# Patient Record
Sex: FEMALE | URBAN - METROPOLITAN AREA
[De-identification: names, ages, dates, MRNs, and addresses within clinical notes are randomized per-mention and may not be internally consistent; named-entity substitution may affect disease eponyms.]

---

## 2021-01-05 ENCOUNTER — NEW PATIENT (OUTPATIENT)
Dept: URBAN - METROPOLITAN AREA CLINIC 44 | Facility: CLINIC | Age: 61
End: 2021-01-05
Payer: COMMERCIAL

## 2021-01-05 DIAGNOSIS — H43.393 OTHER VITREOUS OPACITIES, BILATERAL: Primary | ICD-10-CM

## 2021-01-05 DIAGNOSIS — H16.143 PUNCTATE KERATITIS, BILATERAL: ICD-10-CM

## 2021-01-05 PROCEDURE — 92004 COMPRE OPH EXAM NEW PT 1/>: CPT | Performed by: OPTOMETRIST

## 2021-01-05 ASSESSMENT — INTRAOCULAR PRESSURE
OD: 12
OS: 14

## 2021-01-07 ENCOUNTER — TRANSFERRED RECORDS (OUTPATIENT)
Dept: HEALTH INFORMATION MANAGEMENT | Facility: CLINIC | Age: 61
End: 2021-01-07

## 2021-01-19 ENCOUNTER — TRANSFERRED RECORDS (OUTPATIENT)
Dept: HEALTH INFORMATION MANAGEMENT | Facility: CLINIC | Age: 61
End: 2021-01-19

## 2021-03-03 ENCOUNTER — TRANSFERRED RECORDS (OUTPATIENT)
Dept: HEALTH INFORMATION MANAGEMENT | Facility: CLINIC | Age: 61
End: 2021-03-03

## 2021-03-23 ENCOUNTER — TRANSFERRED RECORDS (OUTPATIENT)
Dept: HEALTH INFORMATION MANAGEMENT | Facility: CLINIC | Age: 61
End: 2021-03-23

## 2021-03-24 ENCOUNTER — TRANSFERRED RECORDS (OUTPATIENT)
Dept: HEALTH INFORMATION MANAGEMENT | Facility: CLINIC | Age: 61
End: 2021-03-24

## 2022-03-08 ENCOUNTER — OFFICE VISIT (OUTPATIENT)
Dept: FAMILY MEDICINE | Facility: CLINIC | Age: 62
End: 2022-03-08
Payer: COMMERCIAL

## 2022-03-08 VITALS
HEART RATE: 77 BPM | WEIGHT: 118.1 LBS | BODY MASS INDEX: 21.73 KG/M2 | DIASTOLIC BLOOD PRESSURE: 73 MMHG | HEIGHT: 62 IN | SYSTOLIC BLOOD PRESSURE: 103 MMHG | TEMPERATURE: 97.9 F | OXYGEN SATURATION: 98 %

## 2022-03-08 DIAGNOSIS — N95.1 MENOPAUSAL SYNDROME (HOT FLASHES): ICD-10-CM

## 2022-03-08 DIAGNOSIS — Z00.00 ANNUAL VISIT FOR GENERAL ADULT MEDICAL EXAMINATION WITHOUT ABNORMAL FINDINGS: Primary | ICD-10-CM

## 2022-03-08 DIAGNOSIS — D32.9 MENINGIOMA (H): ICD-10-CM

## 2022-03-08 DIAGNOSIS — F41.9 ANXIETY: ICD-10-CM

## 2022-03-08 LAB
BASOPHILS # BLD AUTO: 0 10E3/UL (ref 0–0.2)
BASOPHILS NFR BLD AUTO: 1 %
EOSINOPHIL # BLD AUTO: 0.1 10E3/UL (ref 0–0.7)
EOSINOPHIL NFR BLD AUTO: 2 %
ERYTHROCYTE [DISTWIDTH] IN BLOOD BY AUTOMATED COUNT: 13.2 % (ref 10–15)
HCT VFR BLD AUTO: 38.2 % (ref 35–47)
HGB BLD-MCNC: 12.5 G/DL (ref 11.7–15.7)
IMM GRANULOCYTES # BLD: 0 10E3/UL
IMM GRANULOCYTES NFR BLD: 0 %
LYMPHOCYTES # BLD AUTO: 2.1 10E3/UL (ref 0.8–5.3)
LYMPHOCYTES NFR BLD AUTO: 36 %
MCH RBC QN AUTO: 32.6 PG (ref 26.5–33)
MCHC RBC AUTO-ENTMCNC: 32.7 G/DL (ref 31.5–36.5)
MCV RBC AUTO: 100 FL (ref 78–100)
MONOCYTES # BLD AUTO: 0.5 10E3/UL (ref 0–1.3)
MONOCYTES NFR BLD AUTO: 8 %
NEUTROPHILS # BLD AUTO: 3.1 10E3/UL (ref 1.6–8.3)
NEUTROPHILS NFR BLD AUTO: 53 %
NRBC # BLD AUTO: 0 10E3/UL
NRBC BLD AUTO-RTO: 0 /100
PLATELET # BLD AUTO: 266 10E3/UL (ref 150–450)
RBC # BLD AUTO: 3.84 10E6/UL (ref 3.8–5.2)
WBC # BLD AUTO: 5.8 10E3/UL (ref 4–11)

## 2022-03-08 PROCEDURE — 85025 COMPLETE CBC W/AUTO DIFF WBC: CPT | Performed by: NURSE PRACTITIONER

## 2022-03-08 PROCEDURE — 82306 VITAMIN D 25 HYDROXY: CPT | Performed by: NURSE PRACTITIONER

## 2022-03-08 RX ORDER — TRAZODONE HYDROCHLORIDE 100 MG/1
TABLET ORAL
COMMUNITY
Start: 2020-06-30 | End: 2022-03-08

## 2022-03-08 RX ORDER — PROGESTERONE 200 MG/1
200 CAPSULE ORAL DAILY
Qty: 90 CAPSULE | Refills: 3 | Status: SHIPPED | OUTPATIENT
Start: 2022-03-08 | End: 2022-03-08

## 2022-03-08 RX ORDER — PROGESTERONE 200 MG/1
200 CAPSULE ORAL DAILY
Qty: 90 CAPSULE | Refills: 3 | Status: SHIPPED | OUTPATIENT
Start: 2022-03-08 | End: 2023-02-21

## 2022-03-08 RX ORDER — ESTRADIOL 1 MG/1
1 TABLET ORAL DAILY
Qty: 90 TABLET | Refills: 3 | Status: SHIPPED | OUTPATIENT
Start: 2022-03-08 | End: 2023-02-21

## 2022-03-08 RX ORDER — TRAZODONE HYDROCHLORIDE 100 MG/1
TABLET ORAL
Qty: 90 TABLET | Refills: 3 | Status: SHIPPED | OUTPATIENT
Start: 2022-03-08 | End: 2023-02-21

## 2022-03-08 RX ORDER — ESCITALOPRAM OXALATE 10 MG/1
10 TABLET ORAL DAILY
Qty: 90 TABLET | Refills: 1 | Status: SHIPPED | OUTPATIENT
Start: 2022-03-08 | End: 2022-03-08

## 2022-03-08 RX ORDER — ESTRADIOL 1 MG/1
1 TABLET ORAL DAILY
COMMUNITY
Start: 2020-06-30 | End: 2022-03-08

## 2022-03-08 RX ORDER — MULTIPLE VITAMINS W/ MINERALS TAB 9MG-400MCG
1 TAB ORAL DAILY
COMMUNITY

## 2022-03-08 RX ORDER — ESTRADIOL 1 MG/1
1 TABLET ORAL DAILY
Qty: 90 TABLET | Refills: 3 | Status: SHIPPED | OUTPATIENT
Start: 2022-03-08 | End: 2022-03-08

## 2022-03-08 RX ORDER — ESCITALOPRAM OXALATE 10 MG/1
10 TABLET ORAL DAILY
COMMUNITY
End: 2022-03-08

## 2022-03-08 RX ORDER — TRAZODONE HYDROCHLORIDE 100 MG/1
TABLET ORAL
Qty: 90 TABLET | Refills: 3 | Status: SHIPPED | OUTPATIENT
Start: 2022-03-08 | End: 2022-03-08

## 2022-03-08 RX ORDER — ESCITALOPRAM OXALATE 10 MG/1
10 TABLET ORAL DAILY
Qty: 90 TABLET | Refills: 1 | Status: SHIPPED | OUTPATIENT
Start: 2022-03-08 | End: 2022-08-02

## 2022-03-08 RX ORDER — PROGESTERONE 200 MG/1
1 CAPSULE ORAL DAILY
COMMUNITY
Start: 2020-06-30 | End: 2022-03-08

## 2022-03-08 ASSESSMENT — ANXIETY QUESTIONNAIRES
IF YOU CHECKED OFF ANY PROBLEMS ON THIS QUESTIONNAIRE, HOW DIFFICULT HAVE THESE PROBLEMS MADE IT FOR YOU TO DO YOUR WORK, TAKE CARE OF THINGS AT HOME, OR GET ALONG WITH OTHER PEOPLE: SOMEWHAT DIFFICULT
GAD7 TOTAL SCORE: 7
7. FEELING AFRAID AS IF SOMETHING AWFUL MIGHT HAPPEN: SEVERAL DAYS
6. BECOMING EASILY ANNOYED OR IRRITABLE: SEVERAL DAYS
5. BEING SO RESTLESS THAT IT IS HARD TO SIT STILL: SEVERAL DAYS
2. NOT BEING ABLE TO STOP OR CONTROL WORRYING: SEVERAL DAYS
3. WORRYING TOO MUCH ABOUT DIFFERENT THINGS: SEVERAL DAYS
1. FEELING NERVOUS, ANXIOUS, OR ON EDGE: SEVERAL DAYS

## 2022-03-08 ASSESSMENT — PATIENT HEALTH QUESTIONNAIRE - PHQ9
5. POOR APPETITE OR OVEREATING: SEVERAL DAYS
SUM OF ALL RESPONSES TO PHQ QUESTIONS 1-9: 3

## 2022-03-08 NOTE — NURSING NOTE
"ROOM:1    Preferred Name: Keisha     61 year old  Chief Complaint   Patient presents with     Physical       Blood pressure 103/73, pulse 77, temperature 97.9  F (36.6  C), temperature source Oral, height 1.577 m (5' 2.1\"), weight 53.6 kg (118 lb 1.6 oz), last menstrual period 03/01/2015, SpO2 98 %. Body mass index is 21.53 kg/m .      There is no problem list on file for this patient.      Wt Readings from Last 2 Encounters:   03/08/22 53.6 kg (118 lb 1.6 oz)     BP Readings from Last 3 Encounters:   03/08/22 103/73       No Known Allergies    Current Outpatient Medications   Medication     Calcium Carb-Cholecalciferol (CALCIUM 1000 + D PO)     cholecalciferol 25 MCG (1000 UT) TABS     CYANOCOBALAMIN PO     escitalopram (LEXAPRO) 10 MG tablet     estradiol (ESTRACE) 1 MG tablet     multivitamin w/minerals (MULTIVITAMINS W/MINERALS) tablet     progesterone (PROMETRIUM) 200 MG capsule     traZODone (DESYREL) 100 MG tablet     No current facility-administered medications for this visit.       Social History     Tobacco Use     Smoking status: Not on file     Smokeless tobacco: Not on file   Substance Use Topics     Alcohol use: Not on file     Drug use: Not on file       Honoring Choices - Health Care Directive Guide offered to patient at time of visit.    There are no preventive care reminders to display for this patient.      There is no immunization history on file for this patient.    No results found for: PAP      No lab results found.    No flowsheet data found.    No flowsheet data found.    No flowsheet data found.    No flowsheet data found.      Alcira Kat, LYNDON  March 8, 2022 11:18 AM    "

## 2022-03-08 NOTE — PROGRESS NOTES
ANNUAL WELLNESS EXAM     Today's Date: Mar 8, 2022     Patient Keisha Thakur 1960 presents to the clinic today for a preventative health visit.         SUBJECTIVE     History of Present Illness:    Keisha Thakur is a 59 y.o. female with a hx of MCI, Meningioma, Perimenopausal, Asthma, and Depression who presents for annual wellness exam.    Recent health events: Patient reports that she had Covid-19 mid-January of this year. She states that her complications and course of illness were not severe, but she does experience chronic fatigue still. She notes this can also be due to relocating to [place] after spending time living in [place] and [place] where the sun and warm weather helped with her overall mood and energy. Aside from this, she states that she feels in overall a good state of health.     Meningioma: Patient has previously been following neurology/neurosurgery for monitoring of a right frontal meningioma. She reports that she has been completed MRIs every two years, next due 2023. She is wanting a referral to [facility] neurology to establish care after recently relocating to [place].     Asthma: Reports this is well-controlled. She has an emergency albuterol inhaler which she takes as needed. She has not had recent need for her inhaler. No recent hospitalizations due to her asthma. She uses it primarily for sports/exercise.     IBS: Reports that her IBS, constipation type, is well-controlled. Notes that stress typically triggers flares. She states that this has been significantly better within the last year.     Mood: She recently changed her medication regimen from Effexor to Lexapro. She notes that since she has switched to Lexapro, her mood has significantly improved and feels well-controlled. No SI/SH.     Insomnia/Menopausal Symptoms: Sleeping well at night overall. Trazodone 100 mg nightly PRN is working well. Progesterone and estradiol controlling symptoms well. No GI/ complaints.  Notes night sweats to no longer occur.     Immunization: Declines flu vaccine. Will receive Covid booster at local [facility] pharmacy. Per patient, she is up to date on other immunizations.     No Known Allergies     Current Outpatient Medications   Medication Instructions     Calcium Carb-Cholecalciferol (CALCIUM 1000 + D PO) Oral     cholecalciferol 400 Units, Oral     CYANOCOBALAMIN PO Oral     escitalopram (LEXAPRO) 10 mg, Oral, DAILY     estradiol (ESTRACE) 1 MG tablet 1 tablet, Oral, DAILY     multivitamin w/minerals (MULTIVITAMINS W/MINERALS) tablet 1 tablet, Oral, DAILY     progesterone (PROMETRIUM) 200 MG capsule 1 capsule, Oral, DAILY     traZODone (DESYREL) 100 MG tablet Take 100 mg at bedtime as needed     Past Medical History:   Diagnosis Date     Anxiety      Depression      IBS (irritable bowel syndrome)      Meningioma (H)      Post-menopause      Uncomplicated asthma       Family History   Problem Relation Age of Onset     Osteoporosis Mother      Hypertension Mother      Lung Cancer Father      Myocardial Infarction Brother      Kidney Disease Maternal Grandmother       Do you have a first-degree relative with a history of the following:  A. Cancer of the breast or ovaries - No   B. Heart attack, heart pain, or stroke before the age of 55 - Yes - brother MI age 55  C. Unexplained death from drowning or car accident - No  D. Osteoporosis or any other significant bone health concerns - Yes  - mother    Social History     Tobacco Use     Smoking status: Never Smoker     Smokeless tobacco: Never Used   Vaping Use     Vaping Use: Never used   Substance Use Topics     Alcohol use: Yes     Drug use: Never      History   Sexual Activity     Sexual activity: Not Currently         PHQ 3/8/2022   PHQ-9 Total Score 3   Q9: Thoughts of better off dead/self-harm past 2 weeks Not at all      ANDREA-7 SCORE 3/8/2022   Total Score 7       There is no immunization history on file for this patient.     Health Maintenance Due  "  Topic Date Due     PREVENTIVE CARE VISIT  Never done     ADVANCE CARE PLANNING  Never done     MAMMO SCREENING  Never done     COVID-19 Vaccine (1) Never done     COLORECTAL CANCER SCREENING  Never done     HIV SCREENING  Never done     HEPATITIS C SCREENING  Never done     PAP  Never done     LIPID  Never done     ZOSTER IMMUNIZATION (1 of 2) Never done     INFLUENZA VACCINE (1) 2021        Diet: in general, a \"healthy\" diet  , well balanced, vegetarian    Exercise: plays pickleball and attends yoga - is very active     LMP absent.  0 Para 0. The current method of family planning is post menopausal status.     10 point ROS of systems including Constitutional, Eyes, Respiratory, Cardiovascular, Gastroenterology, Genitourinary, Integumentary, Muscularskeletal, Psychiatric were all negative except for pertinent positives noted in my HPI.         OBJECTIVE     /73   Pulse 77   Temp 97.9  F (36.6  C) (Oral)   Ht 1.577 m (5' 2.1\")   Wt 53.6 kg (118 lb 1.6 oz)   LMP 2015   SpO2 98%   BMI 21.53 kg/m         Estimated body mass index is 21.53 kg/m  as calculated from the following:    Height as of this encounter: 1.577 m (5' 2.1\").    Weight as of this encounter: 53.6 kg (118 lb 1.6 oz).    Labs:  No results found for: WBC, HGB, HCT, PLT, CHOL, TRIG, HDL, LDLDIRECT, ALT, AST, NA, CREATININE, BUN, CO2, TSH, PSA, INR, GLUF, HGBA1C, MICROALBUR    Physical Exam  Constitutional:       General: Not in acute distress. Gait steady.     Appearance: Well-developed. Well-groomed. Appropriately dressed.   HENT:      Head: Normocephalic and atraumatic.      Right Ear: TM intact, pearly gray, and semitransparent. Ear canals clear. External ear without lesion or deformity.     Left Ear: TM intact, pearly gray, and semitransparent. Ear canals clear. External ear without lesion or deformity.     Nose: No septal deviation. Nasal turbinates not swollen. Nares patent.      Mouth/Throat: Moist and intact mucous " membranes. No visible lesions or dental decay/carries. Tonsillar position +1.     Tongue and uvula midline.      Pharynx: No oropharyngeal exudate or erythema.   Eyes:      General: No discharge or crusting, both eyes. Lids symmetric. Bilateral EOMs intact w/o nystagmus.           Conjunctiva/sclera: Conjunctivae pink. Sclera white, non-icteric or injected.      Pupils: Pupils are equal, round, reactive to light, and accommodate.   Neck:      Thyroid: No thyromegaly, nodules, or tenderness.       Trachea: No tracheal deviation.       Lymph nodes: No adenopathy of head, neck, or subclavian chain.    Cardiovascular:      Rate and Rhythm: Regular rate and regular rhythm.      Heart sounds: S1 & S2 audible. No S3 or S4 sounds heard. No audible murmur, rubs, or gallops.     Extremities: +2 pulses, no peripheral edema, cap refill < 2 sec in bilateral upper and lower extremities.   Pulmonary:      Respirations unlabored. No respiratory distress. Symmetric chest expansion.      Breath sounds: Clear to auscultation in all lung fields. No wheezing or rales.   Chest/Breast:     Patient declined exam.   Abdominal:      General: Bowel sounds are normoactive in all four quadrants.     Palpations: Abdomen is soft, no distention. There are no masses or hernias. No organomegaly.      Tenderness: There is no abdominal tenderness.  Genitourinary:     Deferred.   Musculoskeletal:         General: Full ROM in bilateral upper and lower extremities. 5/5 strength in bilateral upper and lower extremities.      No deformities.      Cervical back: Neck supple, full ROM.   Skin:     General: Skin is warm, dry, intact. Color consistent with race and ethnicity. Turgor normal. No pallor. No diaphoresis.      Findings: No visible rash, bruising, abnormal nevi, or lesions.   Neurological:      Mental Status: Alert and oriented to person, place, and time. Speech clear. CN II-XII intact. Sensation intact.     Motor: No abnormal muscle tone. Follows  commands.   Psychiatric:         Mood/Affect: Congruent.     Thought Content: Thought content appropriate.         Judgment: Judgment appropriate.          ASSESSMENT/PLAN     (Z00.00) Annual visit for general adult medical examination without abnormal findings  (primary encounter diagnosis)  Comment: Age appropriate screening completed per USPSTF guidelines. Physical exam unremarkable. No acute concerns today. Will complete labs for screening as indicated and follow-up with patient accordingly.   Plan: CBC with platelets differential, Comprehensive         metabolic panel, Lipid panel reflex to direct         LDL Fasting, Vitamin D Level, TSH with free T4         reflex, Screening Mammogram Digital Bilateral    (F41.9) Anxiety  Comment: Stable, patient feels well-controlled. Lexapro refilled. Follow-up in 6 months. Has been on trazodone for insomnia secondary to anxiety, has been on stable dose for 20 years. Refilled today.   Plan: escitalopram (LEXAPRO) 10 MG tablet, traZODone         (DESYREL) 100 MG tablet    (D32.9) Meningioma (H)  Comment: Patient wishes to establish care with neurology in [place] after relocating from [place]. Has been completing MRIs every two years. Due next in 2023.   Plan: Adult Neurology  Referral    (N95.1) Menopausal syndrome (hot flashes)  Comment: Patient stable on current regimen of estradiol and progesterone for management of menopausal symptoms. Has been on medication regimen for 6 years. Continue with current regimen. Refills sent.   Plan: estradiol (ESTRACE) 1 MG tablet, progesterone         (PROMETRIUM) 200 MG capsule     -Discussed/Reinforced healthy diety, lifestyle, exercise and safety.  -Recommended completion of routine dental and eye exam.  -Lab screenings completed today. Results pending.     PAP (if applicable): Complete Date of Completion: 11/2021 Follow-up Recommendation: 11/2026  CBE (if applicable): Complete Date of Completion: 11/2021 Follow-up Recommendation:  Next annual exam  Mammogram (if applicable): Incomplete Date of Completion: 11/2021 Follow-up Recommendation: Due 11/2022, order placed today  Colon cancer screening (if applicable): Complete Date of Completion: 2/9/21 Follow-up Recommendation: 2/2031  Lung Cancer Screening (if applicable): Not applicable   AAA Screening (if applicable): Not applicable  STI Screening (if applicable): Not applicable   Dexa Scan: Will initiate screening with next annual exam.    Follow-Up:  Follow up in one year, or sooner if needed.     Patient engaged in their plan of care. Patient verbalized understanding and agreed with the final plan.  AVS printed and given to patient.    LUCY Ty [facility]  [facility]  [address] [address]  [phone]

## 2022-03-08 NOTE — PATIENT INSTRUCTIONS
It was very nice to meet you, Keisha! Thank you for choosing the [facility] for your care today.  Today we discussed health maintenance topics including:  - Next pap smear due 11/2026  - Next mammogram due 11/2022, order placed today   - Colonoscopy due 2/2031  - Dexa bone scan, not indicated today, discuss at next annual exam  - MRI brain meningioma follow-up, due next year (2023). Neurology referral placed today  - Follow-up on Lexapro and mood in 6 months, 9/2022. This can be a telephone of video visit if you wish.

## 2022-03-09 ENCOUNTER — TELEPHONE (OUTPATIENT)
Dept: FAMILY MEDICINE | Facility: CLINIC | Age: 62
End: 2022-03-09
Payer: COMMERCIAL

## 2022-03-09 LAB — DEPRECATED CALCIDIOL+CALCIFEROL SERPL-MC: 55 UG/L (ref 20–75)

## 2022-03-09 ASSESSMENT — ANXIETY QUESTIONNAIRES: GAD7 TOTAL SCORE: 7

## 2022-03-09 NOTE — TELEPHONE ENCOUNTER
The lab called stating they cancelled the CMP, TSH and Lipid Panel due to the time the tests were ran it was too late. So the labs will need to be drawn.    Luisa NAYLOR MA 10:06 AM 3/9/2022

## 2022-03-10 DIAGNOSIS — Z00.00 ANNUAL VISIT FOR GENERAL ADULT MEDICAL EXAMINATION WITHOUT ABNORMAL FINDINGS: Primary | ICD-10-CM

## 2022-03-10 NOTE — PROGRESS NOTES
Received notice from [facility] that lab was unable to use specimen from visit 3/8/22. Labs re-ordered and patient will be notified that she can go to any [facility] lab to have these labs re-drawn.     LUCY Ty CNP 03/10/2022 8:59 AM   [facility]

## 2022-03-10 NOTE — TELEPHONE ENCOUNTER
Jarad Moran, can you please contact Keisha to let her know that I have reordered these labs and she can go to any [facility] lab location to have them re-drawn at her earliest convenience. Thank you! -Toshia

## 2022-03-11 ENCOUNTER — ALLIED HEALTH/NURSE VISIT (OUTPATIENT)
Dept: FAMILY MEDICINE | Facility: CLINIC | Age: 62
End: 2022-03-11
Payer: COMMERCIAL

## 2022-03-11 DIAGNOSIS — Z00.00 ANNUAL VISIT FOR GENERAL ADULT MEDICAL EXAMINATION WITHOUT ABNORMAL FINDINGS: ICD-10-CM

## 2022-03-11 LAB
ALBUMIN SERPL-MCNC: 3.5 G/DL (ref 3.4–5)
ALP SERPL-CCNC: 44 U/L (ref 40–150)
ALT SERPL W P-5'-P-CCNC: 16 U/L (ref 0–50)
ANION GAP SERPL CALCULATED.3IONS-SCNC: 3 MMOL/L (ref 3–14)
AST SERPL W P-5'-P-CCNC: 17 U/L (ref 0–45)
BILIRUB SERPL-MCNC: 0.4 MG/DL (ref 0.2–1.3)
BUN SERPL-MCNC: 9 MG/DL (ref 7–30)
CALCIUM SERPL-MCNC: 8.5 MG/DL (ref 8.5–10.1)
CHLORIDE BLD-SCNC: 108 MMOL/L (ref 94–109)
CHOLEST SERPL-MCNC: 157 MG/DL
CO2 SERPL-SCNC: 26 MMOL/L (ref 20–32)
CREAT SERPL-MCNC: 0.74 MG/DL (ref 0.52–1.04)
FASTING STATUS PATIENT QL REPORTED: YES
GFR SERPL CREATININE-BSD FRML MDRD: >90 ML/MIN/1.73M2
GLUCOSE BLD-MCNC: 74 MG/DL (ref 70–99)
HDLC SERPL-MCNC: 92 MG/DL
LDLC SERPL CALC-MCNC: 48 MG/DL
NONHDLC SERPL-MCNC: 65 MG/DL
POTASSIUM BLD-SCNC: 5.5 MMOL/L (ref 3.4–5.3)
PROT SERPL-MCNC: 6.5 G/DL (ref 6.8–8.8)
SODIUM SERPL-SCNC: 137 MMOL/L (ref 133–144)
T4 FREE SERPL-MCNC: 0.8 NG/DL (ref 0.76–1.46)
TRIGL SERPL-MCNC: 83 MG/DL
TSH SERPL DL<=0.005 MIU/L-ACNC: 6.2 MU/L (ref 0.4–4)

## 2022-03-11 PROCEDURE — 80061 LIPID PANEL: CPT | Performed by: NURSE PRACTITIONER

## 2022-03-11 PROCEDURE — 82040 ASSAY OF SERUM ALBUMIN: CPT | Performed by: NURSE PRACTITIONER

## 2022-03-11 PROCEDURE — 84439 ASSAY OF FREE THYROXINE: CPT | Performed by: NURSE PRACTITIONER

## 2022-03-11 PROCEDURE — 80053 COMPREHEN METABOLIC PANEL: CPT | Performed by: NURSE PRACTITIONER

## 2022-03-11 PROCEDURE — 84443 ASSAY THYROID STIM HORMONE: CPT | Performed by: NURSE PRACTITIONER

## 2022-04-03 ENCOUNTER — HEALTH MAINTENANCE LETTER (OUTPATIENT)
Age: 62
End: 2022-04-03

## 2022-06-14 ENCOUNTER — MYC MEDICAL ADVICE (OUTPATIENT)
Dept: NEUROSURGERY | Facility: CLINIC | Age: 62
End: 2022-06-14
Payer: COMMERCIAL

## 2022-07-25 ENCOUNTER — TELEPHONE (OUTPATIENT)
Dept: NEUROSURGERY | Facility: CLINIC | Age: 62
End: 2022-07-25

## 2022-07-25 NOTE — TELEPHONE ENCOUNTER
[facility] Call Center    Phone Message    May a detailed message be left on voicemail: yes     Reason for Call: Pt was referred to Neurology back in March for a Meningioma.  She was corresponding with someone via [employer] in regards to this.  She has now decided that she wants an appointment.  She would like an MRI along with a consultation and is requesting that we contact her via [employer] to schedule.  Thanks.

## 2022-08-01 DIAGNOSIS — D32.9 MENINGIOMA (H): Primary | ICD-10-CM

## 2022-08-01 NOTE — TELEPHONE ENCOUNTER
Pt returned phone call.  Writer doesn't have a way to get a hold of Nanci.  Please try calling Pt back.  Thanks.

## 2022-08-01 NOTE — TELEPHONE ENCOUNTER
Writer THEODORE for pt asking for them to call back and schedule a visit    Please schedule a new, in person visit with [place] on a Wednesday. Please also help to schedule MRI (ordered) prior to visit, can be same day    Nanci Clancy

## 2022-08-02 ENCOUNTER — VIRTUAL VISIT (OUTPATIENT)
Dept: FAMILY MEDICINE | Facility: CLINIC | Age: 62
End: 2022-08-02
Payer: COMMERCIAL

## 2022-08-02 DIAGNOSIS — Z12.11 COLON CANCER SCREENING: ICD-10-CM

## 2022-08-02 DIAGNOSIS — Z86.39 HISTORY OF HYPERKALEMIA: ICD-10-CM

## 2022-08-02 DIAGNOSIS — R63.5 WEIGHT GAIN: Primary | ICD-10-CM

## 2022-08-02 DIAGNOSIS — F41.9 ANXIETY: ICD-10-CM

## 2022-08-02 RX ORDER — ESCITALOPRAM OXALATE 10 MG/1
10 TABLET ORAL DAILY
Qty: 90 TABLET | Refills: 1 | Status: SHIPPED | OUTPATIENT
Start: 2022-08-02 | End: 2023-02-14

## 2022-08-02 ASSESSMENT — ANXIETY QUESTIONNAIRES
1. FEELING NERVOUS, ANXIOUS, OR ON EDGE: SEVERAL DAYS
2. NOT BEING ABLE TO STOP OR CONTROL WORRYING: NOT AT ALL
GAD7 TOTAL SCORE: 2
GAD7 TOTAL SCORE: 2
7. FEELING AFRAID AS IF SOMETHING AWFUL MIGHT HAPPEN: NOT AT ALL
6. BECOMING EASILY ANNOYED OR IRRITABLE: NOT AT ALL
5. BEING SO RESTLESS THAT IT IS HARD TO SIT STILL: NOT AT ALL
3. WORRYING TOO MUCH ABOUT DIFFERENT THINGS: SEVERAL DAYS

## 2022-08-02 ASSESSMENT — PATIENT HEALTH QUESTIONNAIRE - PHQ9
SUM OF ALL RESPONSES TO PHQ QUESTIONS 1-9: 2
5. POOR APPETITE OR OVEREATING: NOT AT ALL

## 2022-08-02 NOTE — PROGRESS NOTES
Keisha is a 62 year old who is being evaluated via a billable video visit.      How would you like to obtain your AVS? MyChart  If the video visit is dropped, the invitation should be resent by: Send to e-mail at: [email or web address]  Will anyone else be joining your video visit? No      VIDEO VISIT was switched to phone visit as patient unable to achieve camera and sound connection.     Assessment & Plan   1. Anxiety  Feels like mood improved better overall. Discussed could go higher on dose. Notes reduction currently in exercise which impacts mood. She is hopeful she will be able to resume usual activity post COVID.   - escitalopram (LEXAPRO) 10 MG tablet; Take 1 tablet (10 mg) by mouth daily  Dispense: 90 tablet; Refill: 1    2. Weight gain  History elevated TSH 3/2021. Recheck today. Discussed this might affect mood and weight.   - TSH with free T4 reflex; Future    3. History of hyperkalemia  History K+ 5.5. Will recheck  - Potassium; Future    4. Colon cancer screening  Had colonoscopy 2/21 was told to follow up in summer. Reports colonoscopy was incomplete. Has profound constipation which has not been amenable to treatments. She uses diet and exercise. States bowel cleanses have been incomplete so could not do successful colonoscopy. She denies change in bowel status. Discussed options.  Will obtain FITT today. Discussed follow up colonoscopy recommended.   - Fecal colorectal cancer screen FITT; Future    5. Follow up mammogram. Review of chart shows mammogram 3/2021 with recommended biopsy to follow. She reports has had numerous biopsies all negative.  She obtained mammogram and US in [place] in 12/2021 and was told was normal. She will bring those records in. Recommend mammogram repeat in 12/2022    Review of external notes as documented elsewhere in note  Review of the result(s) of each unique test - TSH with reflex T4, see other interventions  Ordering of each unique test  Prescription drug management  20  minutes spent on the date of the encounter doing chart review, history and exam, documentation and further activities per the note       To schedule lab visit for labs above. Addiitonal management to follow.     No follow-ups on file.    Karyn ReardonLUCY Peña CNP  [facility]    Subjective   Keisha is a 62 year old, presenting for the following health issues:  Depression (Refill of Lexapro)      HPI     62 year old female with history depression and anxiety, breast concerns and chronic constipation presents for video visit for refill of lexapro and followup of depression. Review of chart indicates need for additional follow up  1. Depression/lexapro. Working okay. Had COVID 1 month ago. Has noted reduced energy since then.  Triggers for depression is when she cannot exercise Has not been doing as well.  2. TSH 6.2 in 3/22; no follow up. Needs repeat. K+ was also 5.5 at that time.  3.Chart review: mammogram 3/21; microdensities;  breast biopsy was recommended. No indication this was done. Needs repeat mammogram. Had mammogram and US 12/2021 later in [place] and   4. Colonoscopy 2/21 It was recommended to repeat summer 2021, no records in chart. Has not had repeat. Has IBS with constipation.  Has tried multiple therapies for constipation     Review of Systems     GEN: weight gain, fatigue since COVID. Not as active as usual.  MOOD: as above. Lack of exercise is usually trigger for depressed mood. Does not feel needs increase in dose right now.   ENDO: as per HPI. Dry skin, weight gain.  Chronic constipation.   CV: Negative for CP  BREAST: as per HPI  GI: ongoing constipation not responsive to usual therapies. Unable to fully complete colonoscopy. Denies [facility] colon cancer. Denies change in bowel status.       Objective           Vitals:  No vitals were obtained today due to virtual visit.    Physical Exam   GENERAL: Healthy, alert and no distress  RESP: No audible wheeze, cough, or visible cyanosis.  No  visible retractions or increased work of breathing.    SKIN: Visible skin clear. No significant rash, abnormal pigmentation or lesions.  NEURO: Cranial nerves grossly intact.  Mentation and speech appropriate for age.  PSYCH: Mentation appears normal, affect normal/bright, judgement and insight intact, normal speech and appearance well-groomed.    Tests were ordered, see above.  Results pending.   Will follow up pending lab results.        Video-Visit Details    Phone Start Time: 9:47AM    Type of service:  Phone visit    Phone End Time:10:05Am    Originating Location (pt. Location): Home    Distant Location (provider location):  [facility]     Platform used for Video Visit: Other: Phone    .  ..

## 2022-08-08 NOTE — TELEPHONE ENCOUNTER
9/28/2022-Request for images to be mailed faxed to [facility]-MR @ 745am  -Per [facility] Images were mailed 9/29/2022-MR @ 136pm    Received images, gave to [facility] to be uploaded to PACS 10/4 CJ    FUTURE VISIT INFORMATION      FUTURE VISIT INFORMATION:    Date: 10/7/2022    Time: 10am    [place] [facility]  REFERRAL INFORMATION:    Referring provider:  Toshia AYALA CNP     Referring providers clinic:  [facility]     Reason for visit/diagnosis  Meningioma     RECORDS REQUESTED FROM:       Clinic name Comments Records Status Imaging Status   Internal RENATA Frausto-3/8/2022    MRI Brain-10/3/2022 [facility] PACS         [facility]

## 2022-08-24 ENCOUNTER — LAB (OUTPATIENT)
Dept: LAB | Facility: CLINIC | Age: 62
End: 2022-08-24
Payer: COMMERCIAL

## 2022-08-24 DIAGNOSIS — R63.5 WEIGHT GAIN: ICD-10-CM

## 2022-08-24 DIAGNOSIS — Z86.39 HISTORY OF HYPERKALEMIA: ICD-10-CM

## 2022-08-24 DIAGNOSIS — Z12.11 COLON CANCER SCREENING: ICD-10-CM

## 2022-08-24 PROCEDURE — 84132 ASSAY OF SERUM POTASSIUM: CPT

## 2022-08-24 PROCEDURE — 84443 ASSAY THYROID STIM HORMONE: CPT

## 2022-08-24 PROCEDURE — 36415 COLL VENOUS BLD VENIPUNCTURE: CPT

## 2022-08-25 LAB
POTASSIUM BLD-SCNC: 4.5 MMOL/L (ref 3.4–5.3)
TSH SERPL DL<=0.005 MIU/L-ACNC: 3.62 MU/L (ref 0.4–4)

## 2022-09-28 ENCOUNTER — PRE VISIT (OUTPATIENT)
Dept: NEUROSURGERY | Facility: CLINIC | Age: 62
End: 2022-09-28

## 2022-09-30 ASSESSMENT — ENCOUNTER SYMPTOMS
CONSTIPATION: 1
ABDOMINAL PAIN: 0
VOMITING: 0
DIARRHEA: 0
BLOATING: 1
HEARTBURN: 0
NAUSEA: 0
RECTAL PAIN: 0
BLOOD IN STOOL: 0
BOWEL INCONTINENCE: 0
JAUNDICE: 0

## 2022-10-03 ENCOUNTER — HOSPITAL ENCOUNTER (OUTPATIENT)
Dept: MRI IMAGING | Facility: CLINIC | Age: 62
Discharge: HOME | End: 2022-10-03
Attending: NURSE PRACTITIONER | Admitting: NURSE PRACTITIONER
Payer: COMMERCIAL

## 2022-10-03 ENCOUNTER — HEALTH MAINTENANCE LETTER (OUTPATIENT)
Age: 62
End: 2022-10-03

## 2022-10-03 DIAGNOSIS — D32.9 MENINGIOMA (H): ICD-10-CM

## 2022-10-03 PROCEDURE — 70553 MRI BRAIN STEM W/O & W/DYE: CPT | Mod: 26 | Performed by: STUDENT IN AN ORGANIZED HEALTH CARE EDUCATION/TRAINING PROGRAM

## 2022-10-03 PROCEDURE — 70553 MRI BRAIN STEM W/O & W/DYE: CPT

## 2022-10-03 PROCEDURE — A9585 GADOBUTROL INJECTION: HCPCS | Performed by: NURSE PRACTITIONER

## 2022-10-03 PROCEDURE — 255N000002 HC RX 255 OP 636: Performed by: NURSE PRACTITIONER

## 2022-10-03 RX ORDER — GADOBUTROL 604.72 MG/ML
7.5 INJECTION INTRAVENOUS ONCE
Status: COMPLETED | OUTPATIENT
Start: 2022-10-03 | End: 2022-10-03

## 2022-10-03 RX ADMIN — GADOBUTROL 5.5 ML: 604.72 INJECTION INTRAVENOUS at 13:46

## 2022-10-07 ENCOUNTER — OFFICE VISIT (OUTPATIENT)
Dept: NEUROSURGERY | Facility: CLINIC | Age: 62
End: 2022-10-07
Attending: NURSE PRACTITIONER
Payer: COMMERCIAL

## 2022-10-07 VITALS — SYSTOLIC BLOOD PRESSURE: 127 MMHG | DIASTOLIC BLOOD PRESSURE: 81 MMHG | OXYGEN SATURATION: 100 % | HEART RATE: 68 BPM

## 2022-10-07 DIAGNOSIS — D32.9 MENINGIOMA (H): Primary | ICD-10-CM

## 2022-10-07 PROCEDURE — 99204 OFFICE O/P NEW MOD 45 MIN: CPT | Performed by: PHYSICIAN ASSISTANT

## 2022-10-07 NOTE — PATIENT INSTRUCTIONS
Follow up with Dr. Quinteros in 1 year, with MRI prior to appointment.    Please don't hesitate to reach out sooner with new questions or concerns.

## 2022-10-07 NOTE — LETTER
10/7/2022       RE: Keisha Thakur  [address]  [facility] [address]     Dear Colleague,    Thank you for referring your patient, Keisha Thakur, to the [facility] [place] at [facility]. Please see a copy of my visit note below.      [facility]  Department of Neurosurgery  Center for Skull Base and Pituitary Surgery    Name: Keisha Thakur  MRN: [id number]  Age: 62 year old  : 1960  10/07/2022      Chief Complaint:   Right sphenoidal meningioma, new patient consult    History of Present Illness:   Keisha Thakur is a 62 year old female with a history of mild asthma, anxiety and depression, and IBS who is here today for a new patient consult regarding a right sphenoid wing meningioma which was discovered incidentally around late  due to what the patient describes today as menopausal symptoms of mental fogginess. This has been followed mostly in [place], where she has been residing full time for the past many years. She and her  have been travelling back and forth from there to [place] but have recently made a permanent move to [place] to be close to the patient's 90 year old mother. Keisha has seen Neuro-Ophthalmology in the past and most recently saw her regular Ophthalmologist last  with reassuring findings. She denies any headaches, blurry or double vision, facial pain or numbness, paresthesias, or weakness in extremities. She wears glasses for both near and far vision deficits. Her prescription has been reasonably stable.     She is . No children.  She's a nonsmoker.  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    Denies family history of meningiomas or other brain tumors.    Review of Systems:   Pertinent items are noted in HPI or as in patient entered ROS below, remainder of complete ROS is negative.     Active Medications:      Current Outpatient Medications:      Calcium Carb-Cholecalciferol (CALCIUM 1000 + D PO), , Disp: , Rfl:      cholecalciferol 25 MCG (1000 UT) TABS, Take 400 Units by mouth, Disp: , Rfl:      CYANOCOBALAMIN PO, , Disp: , Rfl:      escitalopram (LEXAPRO) 10 MG tablet, Take 1 tablet (10 mg) by mouth daily, Disp: 90 tablet, Rfl: 1     estradiol (ESTRACE) 1 MG tablet, Take 1 tablet (1 mg) by mouth daily, Disp: 90 tablet, Rfl: 3     multivitamin w/minerals (THERA-VIT-M) tablet, Take 1 tablet by mouth daily, Disp: , Rfl:      progesterone (PROMETRIUM) 200 MG capsule, Take 1 capsule (200 mg) by mouth daily, Disp: 90 capsule, Rfl: 3     traZODone (DESYREL) 100 MG tablet, Take 100 mg at bedtime as needed, Disp: 90 tablet, Rfl: 3      Allergies:   Patient has no known allergies.      Past Medical History:  Past Medical History:   Diagnosis Date     Anxiety      Depression      IBS (irritable bowel syndrome)      Meningioma (H)      Post-menopause      Uncomplicated asthma         Past Surgical History:  Past Surgical History:   Procedure Laterality Date     AS CAUTERY ANAL FISSURE,INITIAL       BREAST EXCISIONAL BIOPSY       REPAIR HAMMER TOE         Family History:   Family History   Problem Relation Age of Onset     Osteoporosis Mother      Hypertension Mother      Myocardial Infarction Mother      Lung Cancer Father      Kidney Disease Maternal Grandmother      Myocardial Infarction Brother          Social History:   Social History     Tobacco Use     Smoking status: Never Smoker     Smokeless tobacco: Never Used   Vaping Use     Vaping Use: Never used   Substance Use Topics     Alcohol use: Yes     Drug use: Never        Physical Exam:   /81   Pulse 68   LMP 03/01/2015   SpO2 100%    General: No acute distress.   Eyes: Conjunctivae are normal. Right pupil slightly larger than left, 3mm R, 2mm L  MSK: Moves all extremities.  No obvious deformity.  Neuro: The patient is fully oriented. Speech is normal.  "Extraocular movements are intact without nystagmus. Facial sensation is intact in V1, V2, V3 distributions. Facial nerve function is normal, rated as a House Brackmann 1. Shoulders are full strength. Full strength in all extremities. Sensation intact throughout. Gait is normal.  Psych: Normal mood and affect. Behavior is normal.      Imaging:  We reviewed the MRI done 10/3/2022 and, when compared to past images, the right sphenoid wing mass has had perhaps very minimal growth since 2014. No mass effect on the orbital apparatus.     \"Findings:  There is an extra axial mass mass involving the lateral right greater  sphenoid wing with associated mild mass effect on the right  anteromedial temporal lobe. The mass is T1 isointense to the brain  parenchyma and T2/FLAIR hypointense without diffusion restriction.  There is susceptibility artifact on SWI images, likely representing  calcification, and it enhances on postcontrast series. Roughly the  lesion measures as 2.3 x 1.6 x 1.9 cm no definite evidence of  infiltration of the adjacent orbital apex or cavernous sinus. The  lesion is mostly growing towards the medial aspect of the medial  cranial fossa on the right.     There is no midline shift or evidence of intracranial hemorrhage. The  ventricles are proportionate to the cerebral sulci. Normal major  vascular intracranial flow-voids.     Postcontrast images demonstrate no abnormal intracranial enhancement.     There are 2 small, well defined, round T1 hyperintense foci within the  superior left parietal bone that completely suppress on fat-saturated  sequences (T1, T2, and FLAIR), which likely represents intraosseous  lipoma versus hemangioma. The visualized portions of paranasal  sinuses, and mastoid air cells are relatively clear. The orbits are  grossly unremarkable.                                                                      Impression:  Extra-axial mass arising from the right greater sphenoid wing, " "most  consistent with a meningioma. This has been described in prior outside  brain MRI reports; however, images are not available for direct  comparison.     I have personally reviewed the examination and initial interpretation  and I agree with the findings.     JOSE ALBERTO RESENDIZ MD \"     Assessment:  Right sphenoid wing meningioma, new patient consult.    Plan:  We discussed options for management including observation, targeted radiation, and surgical resection. She has discussed GK radiation with her previous Neurosurgeon in the past and at this time is not interested. I encouraged annual Ophthalmology exams and she'd like to continue to observe this mass. Given it's stability since 2014 this is a reasonable approach. We'll plan for a follow up in 1 year with MRI prior. She was encouraged to reach out sooner with new questions or concerns in the meantime.     Discussed with Dr. Quinteros who agrees with the above plan.      Emilee Eagle PA-C  Department of Neurosurgery  Center for Skull Base and Pituitary Surgery  [facility]  "

## 2022-10-07 NOTE — PROGRESS NOTES
[facility]  Department of Neurosurgery  Center for Skull Base and Pituitary Surgery    Name: Keisha Thakur  MRN: [id number]  Age: 62 year old  : 1960  10/07/2022      Chief Complaint:   Right sphenoidal meningioma, new patient consult    History of Present Illness:   Keisha Thakur is a 62 year old female with a history of mild asthma, anxiety and depression, and IBS who is here today for a new patient consult regarding a right sphenoid wing meningioma which was discovered incidentally around late  due to what the patient describes today as menopausal symptoms of mental fogginess. This has been followed mostly in [place], where she has been residing full time for the past many years. She and her  have been travelling back and forth from there to [place] but have recently made a permanent move to [place] to be close to the patient's 90 year old mother. Keisha has seen Neuro-Ophthalmology in the past and most recently saw her regular Ophthalmologist last fall with reassuring findings. She denies any headaches, blurry or double vision, facial pain or numbness, paresthesias, or weakness in extremities. She wears glasses for both near and far vision deficits. Her prescription has been reasonably stable.     She is . No children.  She's a nonsmoker.  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    Denies family history of meningiomas or other brain tumors.    Review of Systems:   Pertinent items are noted in HPI or as in patient entered ROS below, remainder of complete ROS is negative.     Active Medications:     Current Outpatient Medications:      Calcium Carb-Cholecalciferol (CALCIUM 1000 + D PO), , Disp: , Rfl:      cholecalciferol 25 MCG (1000 UT) TABS, Take 400 Units by mouth, Disp: , Rfl:      CYANOCOBALAMIN PO, , Disp: , Rfl:      escitalopram (LEXAPRO) 10 MG tablet, Take 1 tablet (10 mg) by mouth daily,  Disp: 90 tablet, Rfl: 1     estradiol (ESTRACE) 1 MG tablet, Take 1 tablet (1 mg) by mouth daily, Disp: 90 tablet, Rfl: 3     multivitamin w/minerals (THERA-VIT-M) tablet, Take 1 tablet by mouth daily, Disp: , Rfl:      progesterone (PROMETRIUM) 200 MG capsule, Take 1 capsule (200 mg) by mouth daily, Disp: 90 capsule, Rfl: 3     traZODone (DESYREL) 100 MG tablet, Take 100 mg at bedtime as needed, Disp: 90 tablet, Rfl: 3      Allergies:   Patient has no known allergies.      Past Medical History:  Past Medical History:   Diagnosis Date     Anxiety      Depression      IBS (irritable bowel syndrome)      Meningioma (H)      Post-menopause      Uncomplicated asthma         Past Surgical History:  Past Surgical History:   Procedure Laterality Date     AS CAUTERY ANAL FISSURE,INITIAL       BREAST EXCISIONAL BIOPSY       REPAIR HAMMER TOE         Family History:   Family History   Problem Relation Age of Onset     Osteoporosis Mother      Hypertension Mother      Myocardial Infarction Mother      Lung Cancer Father      Kidney Disease Maternal Grandmother      Myocardial Infarction Brother          Social History:   Social History     Tobacco Use     Smoking status: Never Smoker     Smokeless tobacco: Never Used   Vaping Use     Vaping Use: Never used   Substance Use Topics     Alcohol use: Yes     Drug use: Never        Physical Exam:   /81   Pulse 68   LMP 03/01/2015   SpO2 100%    General: No acute distress.   Eyes: Conjunctivae are normal. Right pupil slightly larger than left, 3mm R, 2mm L  MSK: Moves all extremities.  No obvious deformity.  Neuro: The patient is fully oriented. Speech is normal. Extraocular movements are intact without nystagmus. Facial sensation is intact in V1, V2, V3 distributions. Facial nerve function is normal, rated as a House Brackmann 1. Shoulders are full strength. Full strength in all extremities. Sensation intact throughout. Gait is normal.  Psych: Normal mood and affect.  "Behavior is normal.      Imaging:  We reviewed the MRI done 10/3/2022 and, when compared to past images, the right sphenoid wing mass has had perhaps very minimal growth since 2014. No mass effect on the orbital apparatus.     \"Findings:  There is an extra axial mass mass involving the lateral right greater  sphenoid wing with associated mild mass effect on the right  anteromedial temporal lobe. The mass is T1 isointense to the brain  parenchyma and T2/FLAIR hypointense without diffusion restriction.  There is susceptibility artifact on SWI images, likely representing  calcification, and it enhances on postcontrast series. Roughly the  lesion measures as 2.3 x 1.6 x 1.9 cm no definite evidence of  infiltration of the adjacent orbital apex or cavernous sinus. The  lesion is mostly growing towards the medial aspect of the medial  cranial fossa on the right.     There is no midline shift or evidence of intracranial hemorrhage. The  ventricles are proportionate to the cerebral sulci. Normal major  vascular intracranial flow-voids.     Postcontrast images demonstrate no abnormal intracranial enhancement.     There are 2 small, well defined, round T1 hyperintense foci within the  superior left parietal bone that completely suppress on fat-saturated  sequences (T1, T2, and FLAIR), which likely represents intraosseous  lipoma versus hemangioma. The visualized portions of paranasal  sinuses, and mastoid air cells are relatively clear. The orbits are  grossly unremarkable.                                                                      Impression:  Extra-axial mass arising from the right greater sphenoid wing, most  consistent with a meningioma. This has been described in prior outside  brain MRI reports; however, images are not available for direct  comparison.     I have personally reviewed the examination and initial interpretation  and I agree with the findings.     JOSE ALBERTO RESENDIZ MD \"     Assessment:  Right sphenoid " wing meningioma, new patient consult.    Plan:  We discussed options for management including observation, targeted radiation, and surgical resection. She has discussed GK radiation with her previous Neurosurgeon in the past and at this time is not interested. I encouraged annual Ophthalmology exams and she'd like to continue to observe this mass. Given it's stability since 2014 this is a reasonable approach. We'll plan for a follow up in 1 year with MRI prior. She was encouraged to reach out sooner with new questions or concerns in the meantime.     Discussed with Dr. Quinteros who agrees with the above plan.    Emilee Eagle PA-C  Department of Neurosurgery  Center for Skull Base and Pituitary Surgery  [facility]

## 2022-11-30 ENCOUNTER — OFFICE VISIT (OUTPATIENT)
Dept: FAMILY MEDICINE | Facility: CLINIC | Age: 62
End: 2022-11-30
Payer: COMMERCIAL

## 2022-11-30 VITALS
OXYGEN SATURATION: 100 % | HEART RATE: 72 BPM | WEIGHT: 120 LBS | BODY MASS INDEX: 22.08 KG/M2 | HEIGHT: 62 IN | DIASTOLIC BLOOD PRESSURE: 84 MMHG | TEMPERATURE: 98.6 F | SYSTOLIC BLOOD PRESSURE: 122 MMHG

## 2022-11-30 DIAGNOSIS — R09.81 NASAL CONGESTION: ICD-10-CM

## 2022-11-30 DIAGNOSIS — R05.1 ACUTE COUGH: Primary | ICD-10-CM

## 2022-11-30 LAB
FLUAV AG SPEC QL IA: NEGATIVE
FLUBV AG SPEC QL IA: NEGATIVE

## 2022-11-30 RX ORDER — CODEINE PHOSPHATE AND GUAIFENESIN 10; 100 MG/5ML; MG/5ML
1-2 SOLUTION ORAL EVERY 4 HOURS PRN
Qty: 240 ML | Refills: 0 | Status: SHIPPED | OUTPATIENT
Start: 2022-11-30 | End: 2023-03-14

## 2022-11-30 NOTE — PROGRESS NOTES
Keisha Thakur is a 62 year old female who presents today for a greater than one week history of fatigue, nasal congestion and discharge, cough worse at night.  Keisha has taken a home COVID test that was negative.  She had potential exposure through family to influenza.      Keisha does have a history of asthma but very rarely needs or uses an inhaler.  The cough may be a bit worse because of this background.    Review Of Systems   ROS: 10 point ROS neg other than the symptoms noted above in the HPI.    Past Medical History:   Diagnosis Date     Anxiety      Depression      IBS (irritable bowel syndrome)      Meningioma (H)      Post-menopause      Uncomplicated asthma      Past Surgical History:   Procedure Laterality Date     AS CAUTERY ANAL FISSURE,INITIAL       BREAST EXCISIONAL BIOPSY       REPAIR HAMMER TOE       Social History     Socioeconomic History     Marital status:      Spouse name: Not on file     Number of children: Not on file     Years of education: Not on file     Highest education level: Not on file   Occupational History     Not on file   Tobacco Use     Smoking status: Never     Smokeless tobacco: Never   Vaping Use     Vaping Use: Never used   Substance and Sexual Activity     Alcohol use: Yes     Drug use: Never     Sexual activity: Not Currently   Other Topics Concern     Not on file   Social History Narrative     Not on file     Social Determinants of Health     Financial Resource Strain: Not on file   Food Insecurity: Not on file   Transportation Needs: Not on file   Physical Activity: Not on file   Stress: Not on file   Social Connections: Not on file   Intimate Partner Violence: Not on file   Housing Stability: Not on file     Family History   Problem Relation Age of Onset     Osteoporosis Mother      Hypertension Mother      Myocardial Infarction Mother      Lung Cancer Father      Kidney Disease Maternal Grandmother      Myocardial Infarction Brother        /84   Pulse 72  "  Temp 98.6  F (37  C) (Oral)   Ht 1.575 m (5' 2\")   Wt 54.4 kg (120 lb)   LMP 03/01/2015   SpO2 100%   BMI 21.95 kg/m      Exam:  Constitutional: healthy, alert and moderate distress  Head: Normocephalic. No masses, lesions, tenderness or abnormalities  Neck: Neck supple. No adenopathy. Thyroid symmetric, normal size,, Carotids without bruits.  ENT: ENT exam normal, no neck nodes or sinus tenderness  Cardiovascular: negative, PMI normal. No lifts, heaves, or thrills. RRR. No murmurs, clicks gallops or rub  Respiratory: negative, Percussion normal. Good diaphragmatic excursion. Lungs clear  Psychiatric: mentation appears normal and affect normal/bright    Assessment/Plan:  1. Acute cough    - Influenza A/B Antigen ([facility]) ([facility])  - guaiFENesin-codeine (ROBITUSSIN AC) 100-10 MG/5ML solution; Take 5-10 mLs by mouth every 4 hours as needed for cough  Dispense: 240 mL; Refill: 0    2. Nasal congestion    - Influenza A/B Antigen ([facility]) ([facility])  - guaiFENesin-codeine (ROBITUSSIN AC) 100-10 MG/5ML solution; Take 5-10 mLs by mouth every 4 hours as needed for cough  Dispense: 240 mL; Refill: 0    Fluids, rest, Keisha will follow up in one week if symptoms persist or worsen.    Options for treatment and follow-up care were reviewed with the patient. Patient engaged in the decision making process and verbalized understanding of the options discussed and agreed with the final plan.    "

## 2022-11-30 NOTE — NURSING NOTE
"ROOM:1  [facility]    Preferred Name: Keisha COTTO AGREED:              HM DECLINED:              62 year old  Chief Complaint   Patient presents with     Cold/Sick     Over a week, fatigue, blowing nose constantly and cough with asthma, negative covid test 11/29       Blood pressure 122/84, pulse 72, temperature 98.6  F (37  C), temperature source Oral, height 1.575 m (5' 2\"), weight 54.4 kg (120 lb), last menstrual period 03/01/2015, SpO2 100 %. Body mass index is 21.95 kg/m .  BP completed using cuff size:        There is no problem list on file for this patient.      Wt Readings from Last 2 Encounters:   11/30/22 54.4 kg (120 lb)   03/08/22 53.6 kg (118 lb 1.6 oz)     BP Readings from Last 3 Encounters:   11/30/22 122/84   10/07/22 127/81   03/08/22 103/73       No Known Allergies    Current Outpatient Medications   Medication     Calcium Carb-Cholecalciferol (CALCIUM 1000 + D PO)     cholecalciferol 25 MCG (1000 UT) TABS     escitalopram (LEXAPRO) 10 MG tablet     estradiol (ESTRACE) 1 MG tablet     multivitamin w/minerals (THERA-VIT-M) tablet     progesterone (PROMETRIUM) 200 MG capsule     traZODone (DESYREL) 100 MG tablet     CYANOCOBALAMIN PO     No current facility-administered medications for this visit.       Social History     Tobacco Use     Smoking status: Never     Smokeless tobacco: Never   Vaping Use     Vaping Use: Never used   Substance Use Topics     Alcohol use: Yes     Drug use: Never       Honoring Choices - Health Care Directive Guide offered to patient at time of visit.    Health Maintenance Due   Topic Date Due     ADVANCE CARE PLANNING  Never done     COLORECTAL CANCER SCREENING  Never done     HIV SCREENING  Never done     HEPATITIS C SCREENING  Never done     PAP  Never done     ZOSTER IMMUNIZATION (1 of 2) Never done     DTAP/TDAP/TD IMMUNIZATION (2 - Td or Tdap) 05/08/2022       Immunization History   Administered Date(s) Administered     COVID-19 Vaccine 12+ (Pfizer 2022) " 03/17/2022     COVID-19 Vaccine Bivalent Booster 12+ (Pfizer) 10/28/2022       No results found for: PAP    Recent Labs   Lab Test 08/24/22  1451 03/11/22  0832   LDL  --  48   HDL  --  92   TRIG  --  83   ALT  --  16   CR  --  0.74   GFRESTIMATED  --  >90   ALBUMIN  --  3.5   POTASSIUM 4.5 5.5*   TSH 3.62 6.20*       PHQ-2 ( 1999 Pfizer) 10/7/2022   Q1: Little interest or pleasure in doing things 0   Q2: Feeling down, depressed or hopeless 0   PHQ-2 Score 0       PHQ-9 SCORE 3/8/2022 8/2/2022   PHQ-9 Total Score 3 2       ANDREA-7 SCORE 3/8/2022 8/2/2022   Total Score 7 2       No flowsheet data found.    Luisa Goldman    November 30, 2022 9:55 AM

## 2022-12-08 ENCOUNTER — MYC MEDICAL ADVICE (OUTPATIENT)
Dept: FAMILY MEDICINE | Facility: CLINIC | Age: 62
End: 2022-12-08

## 2022-12-08 DIAGNOSIS — R05.1 ACUTE COUGH: Primary | ICD-10-CM

## 2022-12-09 RX ORDER — BENZONATATE 100 MG/1
100 CAPSULE ORAL 3 TIMES DAILY PRN
Qty: 15 CAPSULE | Refills: 0 | Status: SHIPPED | OUTPATIENT
Start: 2022-12-09 | End: 2023-03-14

## 2023-02-14 DIAGNOSIS — F41.9 ANXIETY: ICD-10-CM

## 2023-02-14 RX ORDER — ESCITALOPRAM OXALATE 10 MG/1
TABLET ORAL
Qty: 90 TABLET | Refills: 1 | Status: SHIPPED | OUTPATIENT
Start: 2023-02-14 | End: 2023-03-14

## 2023-02-21 DIAGNOSIS — N95.1 MENOPAUSAL SYNDROME (HOT FLASHES): ICD-10-CM

## 2023-02-21 DIAGNOSIS — F41.9 ANXIETY: ICD-10-CM

## 2023-02-21 RX ORDER — ESTRADIOL 1 MG/1
1 TABLET ORAL DAILY
Qty: 60 TABLET | Refills: 0 | Status: SHIPPED | OUTPATIENT
Start: 2023-02-21 | End: 2023-03-14

## 2023-02-21 RX ORDER — TRAZODONE HYDROCHLORIDE 100 MG/1
TABLET ORAL
Qty: 90 TABLET | Refills: 3 | Status: SHIPPED | OUTPATIENT
Start: 2023-02-21 | End: 2023-03-14

## 2023-02-21 RX ORDER — PROGESTERONE 200 MG/1
200 CAPSULE ORAL DAILY
Qty: 60 CAPSULE | Refills: 0 | Status: SHIPPED | OUTPATIENT
Start: 2023-02-21 | End: 2023-03-14

## 2023-03-14 ENCOUNTER — OFFICE VISIT (OUTPATIENT)
Dept: FAMILY MEDICINE | Facility: CLINIC | Age: 63
End: 2023-03-14
Payer: COMMERCIAL

## 2023-03-14 VITALS
SYSTOLIC BLOOD PRESSURE: 120 MMHG | WEIGHT: 120.4 LBS | BODY MASS INDEX: 22.16 KG/M2 | DIASTOLIC BLOOD PRESSURE: 82 MMHG | HEIGHT: 62 IN | HEART RATE: 78 BPM | TEMPERATURE: 98.3 F | OXYGEN SATURATION: 97 % | RESPIRATION RATE: 16 BRPM

## 2023-03-14 DIAGNOSIS — N95.1 MENOPAUSAL SYNDROME (HOT FLASHES): ICD-10-CM

## 2023-03-14 DIAGNOSIS — R53.83 OTHER FATIGUE: ICD-10-CM

## 2023-03-14 DIAGNOSIS — Z00.00 ROUTINE HISTORY AND PHYSICAL EXAMINATION OF ADULT: Primary | ICD-10-CM

## 2023-03-14 DIAGNOSIS — F41.9 ANXIETY: ICD-10-CM

## 2023-03-14 DIAGNOSIS — Z78.0 POST-MENOPAUSAL: ICD-10-CM

## 2023-03-14 DIAGNOSIS — R07.89 ATYPICAL CHEST PAIN: ICD-10-CM

## 2023-03-14 LAB
ALBUMIN SERPL BCG-MCNC: 4.5 G/DL (ref 3.5–5.2)
ALP SERPL-CCNC: 49 U/L (ref 35–104)
ALT SERPL W P-5'-P-CCNC: 15 U/L (ref 10–35)
ANION GAP SERPL CALCULATED.3IONS-SCNC: 13 MMOL/L (ref 7–15)
AST SERPL W P-5'-P-CCNC: 28 U/L (ref 10–35)
BASOPHILS # BLD AUTO: 0 10E3/UL (ref 0–0.2)
BASOPHILS NFR BLD AUTO: 0 %
BILIRUB SERPL-MCNC: 0.3 MG/DL
BUN SERPL-MCNC: 14.2 MG/DL (ref 8–23)
CALCIUM SERPL-MCNC: 9.3 MG/DL (ref 8.8–10.2)
CHLORIDE SERPL-SCNC: 104 MMOL/L (ref 98–107)
CREAT SERPL-MCNC: 0.61 MG/DL (ref 0.51–0.95)
DEPRECATED HCO3 PLAS-SCNC: 22 MMOL/L (ref 22–29)
EOSINOPHIL # BLD AUTO: 0.1 10E3/UL (ref 0–0.7)
EOSINOPHIL NFR BLD AUTO: 2 %
ERYTHROCYTE [DISTWIDTH] IN BLOOD BY AUTOMATED COUNT: 13 % (ref 10–15)
FERRITIN SERPL-MCNC: 49 NG/ML (ref 11–328)
GFR SERPL CREATININE-BSD FRML MDRD: >90 ML/MIN/1.73M2
GLUCOSE SERPL-MCNC: 76 MG/DL (ref 70–99)
HBA1C MFR BLD: 5.4 %
HCT VFR BLD AUTO: 37.5 % (ref 35–47)
HGB BLD-MCNC: 12.7 G/DL (ref 11.7–15.7)
IMM GRANULOCYTES # BLD: 0 10E3/UL
IMM GRANULOCYTES NFR BLD: 0 %
LYMPHOCYTES # BLD AUTO: 2.3 10E3/UL (ref 0.8–5.3)
LYMPHOCYTES NFR BLD AUTO: 30 %
MCH RBC QN AUTO: 32.5 PG (ref 26.5–33)
MCHC RBC AUTO-ENTMCNC: 33.9 G/DL (ref 31.5–36.5)
MCV RBC AUTO: 96 FL (ref 78–100)
MONOCYTES # BLD AUTO: 0.5 10E3/UL (ref 0–1.3)
MONOCYTES NFR BLD AUTO: 7 %
NEUTROPHILS # BLD AUTO: 4.7 10E3/UL (ref 1.6–8.3)
NEUTROPHILS NFR BLD AUTO: 61 %
NRBC # BLD AUTO: 0 10E3/UL
NRBC BLD AUTO-RTO: 0 /100
PLATELET # BLD AUTO: 266 10E3/UL (ref 150–450)
POTASSIUM SERPL-SCNC: 4 MMOL/L (ref 3.4–5.3)
PROT SERPL-MCNC: 6.9 G/DL (ref 6.4–8.3)
RBC # BLD AUTO: 3.91 10E6/UL (ref 3.8–5.2)
SODIUM SERPL-SCNC: 139 MMOL/L (ref 136–145)
TSH SERPL DL<=0.005 MIU/L-ACNC: 3.54 UIU/ML (ref 0.3–4.2)
WBC # BLD AUTO: 7.6 10E3/UL (ref 4–11)

## 2023-03-14 PROCEDURE — 83036 HEMOGLOBIN GLYCOSYLATED A1C: CPT | Mod: ORL | Performed by: NURSE PRACTITIONER

## 2023-03-14 PROCEDURE — 85025 COMPLETE CBC W/AUTO DIFF WBC: CPT | Mod: ORL | Performed by: NURSE PRACTITIONER

## 2023-03-14 PROCEDURE — 82728 ASSAY OF FERRITIN: CPT | Mod: ORL | Performed by: NURSE PRACTITIONER

## 2023-03-14 PROCEDURE — 84443 ASSAY THYROID STIM HORMONE: CPT | Mod: ORL | Performed by: NURSE PRACTITIONER

## 2023-03-14 PROCEDURE — 82306 VITAMIN D 25 HYDROXY: CPT | Mod: ORL | Performed by: NURSE PRACTITIONER

## 2023-03-14 PROCEDURE — 80053 COMPREHEN METABOLIC PANEL: CPT | Mod: ORL | Performed by: NURSE PRACTITIONER

## 2023-03-14 RX ORDER — ESTRADIOL 1 MG/1
1 TABLET ORAL DAILY
Qty: 90 TABLET | Refills: 3 | Status: SHIPPED | OUTPATIENT
Start: 2023-03-14 | End: 2024-06-07

## 2023-03-14 RX ORDER — TRAZODONE HYDROCHLORIDE 100 MG/1
TABLET ORAL
Qty: 90 TABLET | Refills: 3 | Status: SHIPPED | OUTPATIENT
Start: 2023-03-14 | End: 2024-06-07

## 2023-03-14 RX ORDER — ESCITALOPRAM OXALATE 10 MG/1
10 TABLET ORAL DAILY
Qty: 90 TABLET | Refills: 3 | Status: SHIPPED | OUTPATIENT
Start: 2023-03-14 | End: 2023-04-18

## 2023-03-14 RX ORDER — PROGESTERONE 200 MG/1
200 CAPSULE ORAL DAILY
Qty: 60 CAPSULE | Refills: 0 | Status: SHIPPED | OUTPATIENT
Start: 2023-03-14 | End: 2023-09-07

## 2023-03-14 ASSESSMENT — ENCOUNTER SYMPTOMS
ACTIVITY CHANGE: 1
EYES NEGATIVE: 1
RESPIRATORY NEGATIVE: 1
DIZZINESS: 1
LIGHT-HEADEDNESS: 1
ENDOCRINE NEGATIVE: 1
ALLERGIC/IMMUNOLOGIC NEGATIVE: 1
PSYCHIATRIC NEGATIVE: 1

## 2023-03-14 ASSESSMENT — ANXIETY QUESTIONNAIRES
GAD7 TOTAL SCORE: 5
6. BECOMING EASILY ANNOYED OR IRRITABLE: SEVERAL DAYS
3. WORRYING TOO MUCH ABOUT DIFFERENT THINGS: SEVERAL DAYS
7. FEELING AFRAID AS IF SOMETHING AWFUL MIGHT HAPPEN: NOT AT ALL
2. NOT BEING ABLE TO STOP OR CONTROL WORRYING: NOT AT ALL
1. FEELING NERVOUS, ANXIOUS, OR ON EDGE: SEVERAL DAYS
5. BEING SO RESTLESS THAT IT IS HARD TO SIT STILL: SEVERAL DAYS
GAD7 TOTAL SCORE: 5

## 2023-03-14 ASSESSMENT — PATIENT HEALTH QUESTIONNAIRE - PHQ9: 5. POOR APPETITE OR OVEREATING: SEVERAL DAYS

## 2023-03-14 NOTE — NURSING NOTE
"ROOM:1  [facility]    Preferred Name: Keisha     How did you hear about us?  Current Patient    62 year old  Chief Complaint   Patient presents with     Dizziness     Vertigo a few weeks ago, couldn't get out of bed because room was spinning and nausea      Chest Pain     Started about three months ago  Is a dull ache, right side of chest     Back Pain     Lower back pain is affecting activity       Blood pressure 120/82, pulse 78, temperature 98.3  F (36.8  C), temperature source Oral, resp. rate 16, height 1.567 m (5' 1.7\"), weight 54.6 kg (120 lb 6.4 oz), last menstrual period 03/01/2015, SpO2 97 %. Body mass index is 22.24 kg/m .  BP completed using cuff size:        There is no problem list on file for this patient.      Wt Readings from Last 2 Encounters:   03/14/23 54.6 kg (120 lb 6.4 oz)   11/30/22 54.4 kg (120 lb)     BP Readings from Last 3 Encounters:   03/14/23 120/82   11/30/22 122/84   10/07/22 127/81       No Known Allergies    Current Outpatient Medications   Medication     Calcium Carb-Cholecalciferol (CALCIUM 1000 + D PO)     cholecalciferol 25 MCG (1000 UT) TABS     escitalopram (LEXAPRO) 10 MG tablet     estradiol (ESTRACE) 1 MG tablet     guaiFENesin-codeine (ROBITUSSIN AC) 100-10 MG/5ML solution     guaiFENesin-codeine (ROBITUSSIN AC) 100-10 MG/5ML solution     multivitamin w/minerals (THERA-VIT-M) tablet     progesterone (PROMETRIUM) 200 MG capsule     traZODone (DESYREL) 100 MG tablet     benzonatate (TESSALON) 100 MG capsule     CYANOCOBALAMIN PO     No current facility-administered medications for this visit.       Social History     Tobacco Use     Smoking status: Never     Smokeless tobacco: Never   Vaping Use     Vaping Use: Never used   Substance Use Topics     Alcohol use: Yes     Drug use: Never       Honoring Choices - Health Care Directive Guide offered to patient at time of visit.    Health Maintenance Due   Topic Date Due     ADVANCE CARE PLANNING  Never done     COLORECTAL " CANCER SCREENING  Never done     HIV SCREENING  Never done     HEPATITIS C SCREENING  Never done     ZOSTER IMMUNIZATION (1 of 2) Never done     DTAP/TDAP/TD IMMUNIZATION (2 - Td or Tdap) 05/08/2022     PAP  07/08/2022     COVID-19 Vaccine (3 - Booster for Pfizer series) 12/23/2022     MAMMO SCREENING  03/03/2023     YEARLY PREVENTIVE VISIT  03/08/2023       Immunization History   Administered Date(s) Administered     COVID-19 Vaccine 12+ (Pfizer 2022) 03/17/2022     COVID-19 Vaccine Bivalent Booster 12+ (Pfizer) 10/28/2022       No results found for: PAP    Recent Labs   Lab Test 08/24/22  1451 03/11/22  0832   LDL  --  48   HDL  --  92   TRIG  --  83   ALT  --  16   CR  --  0.74   GFRESTIMATED  --  >90   ALBUMIN  --  3.5   POTASSIUM 4.5 5.5*   TSH 3.62 6.20*       PHQ-2 ( 1999 Pfizer) 3/14/2023 10/7/2022   Q1: Little interest or pleasure in doing things 0 0   Q2: Feeling down, depressed or hopeless 0 0   PHQ-2 Score 0 0       PHQ-9 SCORE 3/8/2022 8/2/2022   PHQ-9 Total Score 3 2       ANDREA-7 SCORE 3/8/2022 8/2/2022 3/14/2023   Total Score 7 2 5       No flowsheet data found.    [place] Fabian, EMT    March 14, 2023 2:27 PM

## 2023-03-14 NOTE — PROGRESS NOTES
ANNUAL WELLNESS EXAM     Today's Date: Mar 14, 2023     Patient Keisha Thakur 1960 presents to the clinic today for a preventative health visit.         SUBJECTIVE     History of Present Illness:  Keisha is here for a health maintenance and physical exam.  She has 3 health concerns:    1.)  Keisha has had episodes of anterior right chest pain.  This comes and goes, it is not associated with activity, it is more when she is at rest.  She describes it as a dull ache, it does not radiate, she does not become short of breath.  She states she has been experiencing more fatigue than usual.      Keisha is caring for her elderly mother who has heart failure and her mother's  who has dementia.  It is very stressful and a lot of work.  She knows her chest pain could be attributed to that.      2.)  Low back pain:  Keisha is experiencing low back pain that started in October of 2022.  She does yoga each day which feels good still, but her pain continues to come and go.  She does not have red flag symptoms, no change in bowel or bladder habits.  This pain is starting to impact her activities of daily living.      3.)  Keisha had an episode of what she described as vertigo 2 weeks ago.  The room was spinning and she could not get out of bed.  She experienced nausea.  She did not have any chest pain, she did not say that she was not eating or drinking.      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        No Known Allergies     Current Outpatient Medications   Medication Instructions     Calcium Carb-Cholecalciferol (CALCIUM 1000 + D PO) Oral     escitalopram (LEXAPRO) 10 mg, Oral, DAILY     estradiol (ESTRACE) 1 mg, Oral, DAILY     multivitamin w/minerals (THERA-VIT-M) tablet 1 tablet, Oral, DAILY     progesterone (PROMETRIUM) 200 mg, Oral, DAILY     traZODone (DESYREL) 100 MG tablet Take 100 mg at bedtime as needed     Vitamin D3 (CHOLECALCIFEROL) 400 Units, Oral       Past Medical History:    Diagnosis Date     Anxiety      Depression      IBS (irritable bowel syndrome)      Meningioma (H)      Post-menopause      Uncomplicated asthma         Family History   Problem Relation Age of Onset     Osteoporosis Mother      Hypertension Mother      Myocardial Infarction Mother      Lung Cancer Father      Kidney Disease Maternal Grandmother      Myocardial Infarction Brother         Do you have a first-degree relative with a history of the following:  A. Cancer of the breast or ovaries - No   B. Heart attack, heart pain, or stroke before the age of 55 - No  C. Unexplained death from drowning or car accident - No  D. Osteoporosis or any other significant bone health concerns - Yes    Social History     Tobacco Use     Smoking status: Never     Smokeless tobacco: Never   Vaping Use     Vaping Use: Never used   Substance Use Topics     Alcohol use: Yes     Comment: 7/week, wine     Drug use: Never        History   Sexual Activity     Sexual activity: Not Currently     Partners: Male         PHQ 3/8/2022 8/2/2022 3/14/2023   PHQ-9 Total Score 3 2 -   Q9: Thoughts of better off dead/self-harm past 2 weeks Not at all Not at all Not at all        Immunization History   Administered Date(s) Administered     COVID-19 Vaccine 12+ (Pfizer 2022) 03/17/2022     COVID-19 Vaccine Bivalent Booster 12+ (Pfizer) 10/28/2022        Health Maintenance Due   Topic Date Due     ADVANCE CARE PLANNING  Never done     COLORECTAL CANCER SCREENING  Never done     HIV SCREENING  Never done     HEPATITIS C SCREENING  Never done     ZOSTER IMMUNIZATION (1 of 2) Never done     DTAP/TDAP/TD IMMUNIZATION (2 - Td or Tdap) 05/08/2022     PAP  07/08/2022     COVID-19 Vaccine (3 - Booster for Pfizer series) 12/23/2022     MAMMO SCREENING  03/03/2023     YEARLY PREVENTIVE VISIT  03/08/2023      Health Maintenance components reviewed - Seasonal Influenza vaccine status is up to date & Covid-19 vaccine status is up to date.    Diet: in general, a  "\"healthy\" diet      Exercise: 4-5 days/week for an average of 30-45 minutes     Review of Systems   Constitutional: Positive for activity change.        Some change in physical activity due to low back pain   HENT: Negative.    Eyes: Negative.    Respiratory: Negative.    Cardiovascular: Positive for chest pain.        As noted in HPI   Gastrointestinal:        States she has a \"fussy\" stomach   Endocrine: Negative.    Breasts:  negative.    Genitourinary: Negative.    Musculoskeletal:        Back pain as noted in HPI   Skin: Negative.    Allergic/Immunologic: Negative.    Neurological: Positive for dizziness and light-headedness.        As noted in HPI   Psychiatric/Behavioral: Negative.             OBJECTIVE     /82 (BP Location: Left arm, Patient Position: Sitting, Cuff Size: Adult Regular)   Pulse 78   Temp 98.3  F (36.8  C) (Oral)   Resp 16   Ht 1.567 m (5' 1.7\")   Wt 54.6 kg (120 lb 6.4 oz)   LMP 03/01/2015   SpO2 97%   BMI 22.24 kg/m         Estimated body mass index is 22.24 kg/m  as calculated from the following:    Height as of this encounter: 1.567 m (5' 1.7\").    Weight as of this encounter: 54.6 kg (120 lb 6.4 oz).    Complete \"Weight Managment Plan\" in the progress note from the Adult Preventative or Medicare smartsets, use phrase .WEIGHTPLAN, or choose an option from Weight Management Resources smartset below.      Labs:  Lab Results   Component Value Date    WBC 5.8 03/08/2022    HGB 12.5 03/08/2022    HCT 38.2 03/08/2022     03/08/2022    CHOL 157 03/11/2022    TRIG 83 03/11/2022    HDL 92 03/11/2022    ALT 16 03/11/2022    AST 17 03/11/2022     03/11/2022    BUN 9 03/11/2022    CO2 26 03/11/2022    TSH 3.62 08/24/2022       Physical Exam  Constitutional:       Appearance: Normal appearance. She is normal weight.   HENT:      Head: Normocephalic and atraumatic.      Right Ear: Tympanic membrane, ear canal and external ear normal.      Left Ear: Tympanic membrane, ear canal " and external ear normal.      Nose: Nose normal.      Mouth/Throat:      Mouth: Mucous membranes are moist.      Pharynx: Oropharynx is clear.   Eyes:      Extraocular Movements: Extraocular movements intact.      Conjunctiva/sclera: Conjunctivae normal.      Pupils: Pupils are equal, round, and reactive to light.   Cardiovascular:      Rate and Rhythm: Normal rate and regular rhythm.      Pulses: Normal pulses.      Heart sounds: Normal heart sounds.   Pulmonary:      Effort: Pulmonary effort is normal.      Breath sounds: Normal breath sounds.   Abdominal:      General: Abdomen is flat. Bowel sounds are normal.      Palpations: Abdomen is soft.   Musculoskeletal:         General: Normal range of motion.      Cervical back: Normal range of motion and neck supple.   Skin:     General: Skin is warm and dry.      Capillary Refill: Capillary refill takes less than 2 seconds.   Neurological:      General: No focal deficit present.      Mental Status: She is alert and oriented to person, place, and time.   Psychiatric:         Mood and Affect: Mood normal.         Behavior: Behavior normal.         Thought Content: Thought content normal.               ASSESSMENT/PLAN     (Z00.00) Routine history and physical examination of adult  (primary encounter diagnosis)    Plan: CBC with platelets differential, Comprehensive         metabolic panel, Hemoglobin A1c, TSH with free         T4 reflex    (R07.89) Atypical chest pain    Plan: EKG 12-lead complete w/read - Clinics NORMAL    Keisha will let me know if this progresses, she would follow up with me or Karyn MAYORGA    (R53.83) Other fatigue    Plan: Ferritin, Vitamin D Level    (Z78.0) Post-menopausal    Plan: DX Hip/Pelvis/Spine (DEXA)    (F41.9) Anxiety    Plan: traZODone (DESYREL) 100 MG tablet, escitalopram        (LEXAPRO) 10 MG tablet    (N95.1) Menopausal syndrome (hot flashes)    Plan: progesterone (PROMETRIUM) 200 MG capsule,         estradiol (ESTRACE) 1 MG tablet    We  discussed physical therapy for back pain, Keisha will let me know if she is interested and we will make the referral.    -Discussed/Reinforced healthy diety, lifestyle, exercise and safety.  -Recommended completion of routine dental and eye exam.  -Lab screenings completed today. Results pending.     PAP (if applicable): Complete Date of Completion: 2019 Follow-up Recommendation: 2024  CBE (if applicable): Complete Date of Completion: today Follow-up Recommendation: one year  Mammogram (if applicable): Complete Date of Completion: March 2022 Follow-up Recommendation: March-April 2023  Colon cancer screening (if applicable): Complete Date of Completion: 2/9/2021 Follow-up Recommendation: 2031  Dexa Bone Density Screening (if applicable): ordered today Date of Completion: ordered today Follow-up Recommendation: as needed  Cholesterol Screening (if applicable): Complete Date of Completion: today Follow-up Recommendation: as needed  Diabetes Screening (if applicable): Complete Date of Completion: today Follow-up Recommendation: as needed  Thyroid Screening (if applicable): Complete Date of Completion: today Follow-up Recommendation: as needed  Depression Screening (if applicable): Complete Date of Completion: today Follow-up Recommendation: as needed    Follow-Up:  Follow up in one year, or sooner if needed.     Patient engaged in their plan of care. Patient verbalized understanding and agreed with the final plan.  AVS printed and given to patient.    Antionette Robles NP   [facility]  [facility]  [address] [address]  [phone]

## 2023-03-16 LAB — DEPRECATED CALCIDIOL+CALCIFEROL SERPL-MC: 43 UG/L (ref 20–75)

## 2023-03-22 ENCOUNTER — HOSPITAL ENCOUNTER (OUTPATIENT)
Dept: BONE DENSITY | Facility: CLINIC | Age: 63
Discharge: HOME | End: 2023-03-22
Attending: NURSE PRACTITIONER | Admitting: NURSE PRACTITIONER
Payer: COMMERCIAL

## 2023-03-22 DIAGNOSIS — Z78.0 POST-MENOPAUSAL: ICD-10-CM

## 2023-03-22 PROCEDURE — 77080 DXA BONE DENSITY AXIAL: CPT

## 2023-04-18 ENCOUNTER — OFFICE VISIT (OUTPATIENT)
Dept: FAMILY MEDICINE | Facility: CLINIC | Age: 63
End: 2023-04-18
Payer: COMMERCIAL

## 2023-04-18 VITALS
DIASTOLIC BLOOD PRESSURE: 73 MMHG | WEIGHT: 123 LBS | TEMPERATURE: 97.6 F | SYSTOLIC BLOOD PRESSURE: 110 MMHG | HEART RATE: 75 BPM | HEIGHT: 64 IN | OXYGEN SATURATION: 100 % | BODY MASS INDEX: 21 KG/M2

## 2023-04-18 DIAGNOSIS — F41.9 ANXIETY: ICD-10-CM

## 2023-04-18 RX ORDER — ESCITALOPRAM OXALATE 20 MG/1
20 TABLET ORAL DAILY
Qty: 90 TABLET | Refills: 3 | Status: SHIPPED | OUTPATIENT
Start: 2023-04-18 | End: 2024-06-06

## 2023-04-18 RX ORDER — ESCITALOPRAM OXALATE 20 MG/1
10 TABLET ORAL DAILY
Qty: 90 TABLET | Refills: 3 | Status: SHIPPED | OUTPATIENT
Start: 2023-04-18 | End: 2023-04-18

## 2023-04-18 ASSESSMENT — ANXIETY QUESTIONNAIRES
7. FEELING AFRAID AS IF SOMETHING AWFUL MIGHT HAPPEN: NOT AT ALL
6. BECOMING EASILY ANNOYED OR IRRITABLE: NEARLY EVERY DAY
GAD7 TOTAL SCORE: 14
3. WORRYING TOO MUCH ABOUT DIFFERENT THINGS: NEARLY EVERY DAY
GAD7 TOTAL SCORE: 14
2. NOT BEING ABLE TO STOP OR CONTROL WORRYING: MORE THAN HALF THE DAYS
1. FEELING NERVOUS, ANXIOUS, OR ON EDGE: NEARLY EVERY DAY
IF YOU CHECKED OFF ANY PROBLEMS ON THIS QUESTIONNAIRE, HOW DIFFICULT HAVE THESE PROBLEMS MADE IT FOR YOU TO DO YOUR WORK, TAKE CARE OF THINGS AT HOME, OR GET ALONG WITH OTHER PEOPLE: SOMEWHAT DIFFICULT
5. BEING SO RESTLESS THAT IT IS HARD TO SIT STILL: NOT AT ALL

## 2023-04-18 ASSESSMENT — PATIENT HEALTH QUESTIONNAIRE - PHQ9
5. POOR APPETITE OR OVEREATING: NEARLY EVERY DAY
SUM OF ALL RESPONSES TO PHQ QUESTIONS 1-9: 14

## 2023-04-18 NOTE — PROGRESS NOTES
Keisha Thakur is a 62 year old female who presents today to discuss an increase in depression symptoms.  She has been treated with medication and therapy off and on since early college years.  She has been on Effexor XR which was good but very difficult to discontinue.  She feels that there is some seasonal affect concerns but she also believes that there is more going on.  Keisha denies suicidal ideation.    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  His personality is difficult and this makes it harder for Keisha.  She is not part of a caretaker care group.    Review Of Systems   ROS: 10 point ROS neg other than the symptoms noted above in the HPI.      Past Medical History:   Diagnosis Date     Anxiety      Depression      IBS (irritable bowel syndrome)      Meningioma (H)      Post-menopause      Uncomplicated asthma      Past Surgical History:   Procedure Laterality Date     AS CAUTERY ANAL FISSURE,INITIAL       BREAST EXCISIONAL BIOPSY       REPAIR HAMMER TOE       Social History     Socioeconomic History     Marital status:      Spouse name: Not on file     Number of children: Not on file     Years of education: Not on file     Highest education level: Not on file   Occupational History     Not on file   Tobacco Use     Smoking status: Never     Smokeless tobacco: Never   Vaping Use     Vaping status: Never Used   Substance and Sexual Activity     Alcohol use: Yes     Comment: 7/week, wine     Drug use: Never     Sexual activity: Not Currently     Partners: Male   Other Topics Concern     Not on file   Social History Narrative     Not on file     Social Determinants of Health     Financial Resource Strain: Not on file   Food Insecurity: Not on file   Transportation Needs: Not on file   Physical Activity: Not on file   Stress: Not on file   Social Connections: Not on file   Intimate Partner Violence: Not on  "file   Housing Stability: Not on file     Family History   Problem Relation Age of Onset     Osteoporosis Mother      Hypertension Mother      Myocardial Infarction Mother      Lung Cancer Father      Kidney Disease Maternal Grandmother      Myocardial Infarction Brother        /73   Pulse 75   Temp 97.6  F (36.4  C) (Oral)   Ht 1.613 m (5' 3.5\")   Wt 55.8 kg (123 lb)   LMP 03/01/2015   SpO2 100%   BMI 21.45 kg/m      Exam:  Constitutional: healthy, alert and mild distress  Psychiatric: mentation appears normal and affect normal/bright    Assessment/Plan:  1. Anxiety and Depression   We discussed depression and use of current medication vs changing and opted to try keep medication the same, try that first and then consider a change if necessary.  - escitalopram (LEXAPRO) 20 MG tablet; Take1 tablets (20 mg) by mouth daily  Dispense: 90 tablet; Refill: 3    I gave Keisha the name and number of [facility] for counseling, for both depression and what is going on with her family.  She will update me per Deon in 2-4 weeks with results of new dose of escitalopram       Options for treatment and follow-up care were reviewed with the patient. Patient engaged in the decision making process and verbalized understanding of the options discussed and agreed with the final plan.    "

## 2023-04-18 NOTE — NURSING NOTE
"ROOM:3  KEL COREAS    Preferred Name: Keisha     How did you hear about us?  Current Patient    62 year old  Chief Complaint   Patient presents with     Medication Request     Seasonal affect disorder        Blood pressure 110/73, pulse 75, temperature 97.6  F (36.4  C), temperature source Oral, height 1.613 m (5' 3.5\"), weight 55.8 kg (123 lb), last menstrual period 03/01/2015, SpO2 100 %. Body mass index is 21.45 kg/m .  BP completed using cuff size:        There is no problem list on file for this patient.      Wt Readings from Last 2 Encounters:   04/18/23 55.8 kg (123 lb)   03/14/23 54.6 kg (120 lb 6.4 oz)     BP Readings from Last 3 Encounters:   04/18/23 110/73   03/14/23 120/82   11/30/22 122/84       No Known Allergies    Current Outpatient Medications   Medication     Calcium Carb-Cholecalciferol (CALCIUM 1000 + D PO)     cholecalciferol 25 MCG (1000 UT) TABS     escitalopram (LEXAPRO) 10 MG tablet     estradiol (ESTRACE) 1 MG tablet     multivitamin w/minerals (THERA-VIT-M) tablet     progesterone (PROMETRIUM) 200 MG capsule     traZODone (DESYREL) 100 MG tablet     No current facility-administered medications for this visit.       Social History     Tobacco Use     Smoking status: Never     Smokeless tobacco: Never   Vaping Use     Vaping status: Never Used   Substance Use Topics     Alcohol use: Yes     Comment: 7/week, wine     Drug use: Never       Honoring Choices - Health Care Directive Guide offered to patient at time of visit.    Health Maintenance Due   Topic Date Due     ADVANCE CARE PLANNING  Never done     COLORECTAL CANCER SCREENING  Never done     HIV SCREENING  Never done     HEPATITIS C SCREENING  Never done     ZOSTER IMMUNIZATION (1 of 2) Never done     DTAP/TDAP/TD IMMUNIZATION (2 - Td or Tdap) 05/08/2022     PAP  07/08/2022     COVID-19 Vaccine (3 - Booster for Pfizer series) 12/23/2022     MAMMO SCREENING  03/03/2023       Immunization History   Administered Date(s) Administered "     COVID-19 Vaccine 12+ (Pfizer 2022) 03/17/2022     COVID-19 Vaccine Bivalent Booster 12+ (Pfizer) 10/28/2022       No results found for: PAP    Recent Labs   Lab Test 03/14/23  1538 08/24/22  1451 03/11/22  0832   A1C 5.4  --   --    LDL  --   --  48   HDL  --   --  92   TRIG  --   --  83   ALT 15  --  16   CR 0.61  --  0.74   GFRESTIMATED >90  --  >90   ALBUMIN 4.5  --  3.5   POTASSIUM 4.0 4.5 5.5*   TSH 3.54 3.62 6.20*           3/14/2023     2:18 PM 10/7/2022    12:37 PM   PHQ-2 ( 1999 Pfizer)   Q1: Little interest or pleasure in doing things 0 0   Q2: Feeling down, depressed or hopeless 0 0   PHQ-2 Score 0 0           3/8/2022    11:23 AM 8/2/2022     9:22 AM 4/18/2023    10:03 AM   PHQ-9 SCORE   PHQ-9 Total Score 3 2 14           8/2/2022     9:22 AM 3/14/2023     2:18 PM 4/18/2023    10:03 AM   ANDREA-7 SCORE   Total Score 2 5 14            View : No data to display.                Rolo Lowe    April 18, 2023 10:02 AM

## 2023-05-21 ENCOUNTER — HEALTH MAINTENANCE LETTER (OUTPATIENT)
Age: 63
End: 2023-05-21

## 2023-06-05 DIAGNOSIS — F41.9 ANXIETY AND DEPRESSION: Primary | ICD-10-CM

## 2023-06-05 DIAGNOSIS — F32.A ANXIETY AND DEPRESSION: Primary | ICD-10-CM

## 2023-06-05 RX ORDER — VENLAFAXINE HYDROCHLORIDE 75 MG/1
75 CAPSULE, EXTENDED RELEASE ORAL DAILY
Qty: 90 CAPSULE | Refills: 1 | Status: SHIPPED | OUTPATIENT
Start: 2023-06-05 | End: 2023-12-06

## 2023-08-16 ENCOUNTER — TELEPHONE (OUTPATIENT)
Dept: NEUROSURGERY | Facility: CLINIC | Age: 63
End: 2023-08-16
Payer: COMMERCIAL

## 2023-09-01 ENCOUNTER — OFFICE VISIT (OUTPATIENT)
Dept: FAMILY MEDICINE | Facility: CLINIC | Age: 63
End: 2023-09-01
Payer: COMMERCIAL

## 2023-09-01 VITALS
HEIGHT: 63 IN | BODY MASS INDEX: 20.55 KG/M2 | OXYGEN SATURATION: 98 % | HEART RATE: 83 BPM | SYSTOLIC BLOOD PRESSURE: 128 MMHG | DIASTOLIC BLOOD PRESSURE: 78 MMHG | WEIGHT: 116 LBS | TEMPERATURE: 97.5 F

## 2023-09-01 DIAGNOSIS — Z20.822 EXPOSURE TO 2019 NOVEL CORONAVIRUS: Primary | ICD-10-CM

## 2023-09-01 DIAGNOSIS — N95.1 MENOPAUSAL SYNDROME (HOT FLASHES): ICD-10-CM

## 2023-09-01 DIAGNOSIS — N95.0 POST-MENOPAUSAL BLEEDING: ICD-10-CM

## 2023-09-01 DIAGNOSIS — F41.9 ANXIETY: ICD-10-CM

## 2023-09-01 LAB — SARS-COV-2 RNA RESP QL NAA+PROBE: NEGATIVE

## 2023-09-01 PROCEDURE — 87635 SARS-COV-2 COVID-19 AMP PRB: CPT | Mod: ORL

## 2023-09-01 NOTE — PROGRESS NOTES
Assessment & Plan   Keisha was seen today for covid 19 testing.    Diagnoses and all orders for this visit:    Exposure to 2019 novel coronavirus  -     Symptomatic COVID-19 Virus (Coronavirus) by PCR; Future  No signs of respiratory distress. Results pending. Pt is not interested in antiviral therapy due to her mild symptoms. We discussed supportive therapy and symptoms that warrant ED visit.     (N95.0) Post-menopausal bleeding  Plan: US TRANSVAGINAL, NON-OB        -patient had a workup and US in [place] for postmenopausal bleeding. She was told it was likely due to a fibroid. She believes the US was abdominal rather than TV. She is going to get the records and if it was not, TV then she will complete a new US.     Melvi Scott, DUSTIN   ______________________________________    Subjective   Keisha is a 63 year old patient here today for Covid 19 Testing (PCR )    Large family gathering on 8/27. Several members had symptoms and tested positive for COVID-10. Keisha's symptoms started on 8/29. Symptoms include mild sore throat, headache, fatigue, mild tightness in her chest. No fever or chills. Not sure if the chest tightness is stress- mother is in hospice. Wants to make sure she does not have COVID-19 before visiting mother. Two home tests negative (one this AM). Had COVID-19 twice before-pretty mild, mainly felt fatigued.    Vaginal bleeding  -Had this before 1 year ago, told it was likely fibroids when living in [place]  -Not sure if they did a TVUS in [place] for the work-up  -Vaginal bleeding restarted on 8/29- small amount of vaginal spotting each day, decreasing daily  -light cramping    Do you need any refills on your Medications today? No    Medical, surgical, family and social histories reviewed and updated as indicated.   Medications and allergies reviewed and updated as indicated.       ROS  Review Of Systems  See HPI      General Physical Exam:  Vitals: /78   Pulse 83   Temp 97.5  F (36.4  C)  "(Oral)   Ht 1.59 m (5' 2.6\")   Wt 52.6 kg (116 lb)   LMP 03/01/2015   SpO2 98%   BMI 20.81 kg/m    Physical Exam  Vitals and nursing note reviewed.   Constitutional:       General: She is not in acute distress.     Appearance: Normal appearance. She is not ill-appearing.   HENT:      Head: Normocephalic and atraumatic.      Right Ear: Tympanic membrane, ear canal and external ear normal.      Left Ear: Tympanic membrane, ear canal and external ear normal.      Nose: Nose normal. No congestion or rhinorrhea.      Mouth/Throat:      Mouth: Mucous membranes are moist.      Pharynx: No oropharyngeal exudate or posterior oropharyngeal erythema.   Eyes:      Extraocular Movements: Extraocular movements intact.      Conjunctiva/sclera: Conjunctivae normal.      Pupils: Pupils are equal, round, and reactive to light.   Cardiovascular:      Rate and Rhythm: Normal rate and regular rhythm.      Pulses: Normal pulses.      Heart sounds: Normal heart sounds.   Pulmonary:      Effort: Pulmonary effort is normal. No accessory muscle usage or respiratory distress.      Breath sounds: Normal breath sounds. No stridor. No wheezing, rhonchi or rales.   Chest:      Chest wall: No tenderness.   Musculoskeletal:      Cervical back: Normal range of motion and neck supple.   Lymphadenopathy:      Cervical: No cervical adenopathy.   Skin:     General: Skin is warm and dry.      Capillary Refill: Capillary refill takes less than 2 seconds.      Coloration: Skin is not cyanotic.      Nails: There is no clubbing.   Neurological:      General: No focal deficit present.      Mental Status: She is alert and oriented to person, place, and time.      Gait: Gait is intact.   Psychiatric:         Mood and Affect: Mood normal.         Behavior: Behavior normal.         Thought Content: Thought content normal.         Judgment: Judgment normal.         "

## 2023-09-01 NOTE — NURSING NOTE
"ROOM:1  MAYELA NICHOLAS    Preferred Name: Keisha     How did you hear about us?  Current Patient    63 year old  Chief Complaint   Patient presents with     Covid 19 Testing     PCR        Blood pressure 128/78, pulse 83, temperature 97.5  F (36.4  C), temperature source Oral, height 1.59 m (5' 2.6\"), weight 52.6 kg (116 lb), last menstrual period 03/01/2015, SpO2 98 %. Body mass index is 20.81 kg/m .  BP completed using cuff size:        There is no problem list on file for this patient.      Wt Readings from Last 2 Encounters:   09/01/23 52.6 kg (116 lb)   04/18/23 55.8 kg (123 lb)     BP Readings from Last 3 Encounters:   09/01/23 128/78   04/18/23 110/73   03/14/23 120/82       No Known Allergies    Current Outpatient Medications   Medication     Calcium Carb-Cholecalciferol (CALCIUM 1000 + D PO)     cholecalciferol 25 MCG (1000 UT) TABS     escitalopram (LEXAPRO) 20 MG tablet     estradiol (ESTRACE) 1 MG tablet     multivitamin w/minerals (THERA-VIT-M) tablet     progesterone (PROMETRIUM) 200 MG capsule     traZODone (DESYREL) 100 MG tablet     venlafaxine (EFFEXOR XR) 75 MG 24 hr capsule     No current facility-administered medications for this visit.       Social History     Tobacco Use     Smoking status: Never     Smokeless tobacco: Never   Vaping Use     Vaping Use: Never used   Substance Use Topics     Alcohol use: Yes     Comment: 7/week, wine     Drug use: Never       Honoring Choices - Health Care Directive Guide offered to patient at time of visit.    Health Maintenance Due   Topic Date Due     ADVANCE CARE PLANNING  Never done     COLORECTAL CANCER SCREENING  Never done     HIV SCREENING  Never done     HEPATITIS C SCREENING  Never done     ZOSTER IMMUNIZATION (1 of 2) Never done     DTAP/TDAP/TD IMMUNIZATION (2 - Td or Tdap) 05/08/2022     PAP  07/08/2022     MAMMO SCREENING  03/03/2023     INFLUENZA VACCINE (1) 09/01/2023       Immunization History   Administered Date(s) Administered     COVID-19 " Bivalent 12+ (Pfizer) 10/28/2022     COVID-19 Monovalent 12+ (Pfizer 2022) 03/17/2022       No results found for: PAP    Recent Labs   Lab Test 03/14/23  1538 08/24/22  1451 03/11/22  0832   A1C 5.4  --   --    LDL  --   --  48   HDL  --   --  92   TRIG  --   --  83   ALT 15  --  16   CR 0.61  --  0.74   GFRESTIMATED >90  --  >90   ALBUMIN 4.5  --  3.5   POTASSIUM 4.0 4.5 5.5*   TSH 3.54 3.62 6.20*           3/14/2023     2:18 PM 10/7/2022    12:37 PM   PHQ-2 ( 1999 Pfizer)   Q1: Little interest or pleasure in doing things 0 0   Q2: Feeling down, depressed or hopeless 0 0   PHQ-2 Score 0 0           3/8/2022    11:23 AM 8/2/2022     9:22 AM 4/18/2023    10:03 AM   PHQ-9 SCORE   PHQ-9 Total Score 3 2 14           8/2/2022     9:22 AM 3/14/2023     2:18 PM 4/18/2023    10:03 AM   ANDREA-7 SCORE   Total Score 2 5 14            No data to display                Rolo Lowe    September 1, 2023 2:25 PM

## 2023-09-07 RX ORDER — ESCITALOPRAM OXALATE 10 MG/1
TABLET ORAL
Qty: 90 TABLET | Refills: 1 | OUTPATIENT
Start: 2023-09-07

## 2023-09-07 RX ORDER — PROGESTERONE 200 MG/1
200 CAPSULE ORAL DAILY
Qty: 90 CAPSULE | Refills: 3 | Status: SHIPPED | OUTPATIENT
Start: 2023-09-07 | End: 2024-09-09

## 2023-09-07 NOTE — TELEPHONE ENCOUNTER
progesterone (PROMETRIUM) 200 MG capsule 60 capsule 0 3/14/2023     Last Office Visit: 9/1/23  Future Office visit:  none     Refill protocol passed  Krystle Bui RN

## 2023-12-02 DIAGNOSIS — F32.A ANXIETY AND DEPRESSION: ICD-10-CM

## 2023-12-02 DIAGNOSIS — F41.9 ANXIETY AND DEPRESSION: ICD-10-CM

## 2023-12-06 RX ORDER — VENLAFAXINE HYDROCHLORIDE 75 MG/1
75 CAPSULE, EXTENDED RELEASE ORAL DAILY
Qty: 90 CAPSULE | Refills: 0 | Status: SHIPPED | OUTPATIENT
Start: 2023-12-06 | End: 2024-03-07

## 2023-12-06 NOTE — TELEPHONE ENCOUNTER
venlafaxine (EFFEXOR XR) 75 MG 24 hr capsule 90 capsule 1 6/5/2023  Last Office Visit : 9/1/2023  [facility]     Melvi Scott, NP     Future Office visit:  0    Routing refill request to provider for review/approval because:  Please note last PHQ-9 per protocol: 14  PHQ-9 score:        4/18/2023    10:03 AM   PHQ   PHQ-9 Total Score 14   Q9: Thoughts of better off dead/self-harm past 2 weeks Not at all         Warnings Override History for venlafaxine (EFFEXOR XR) 75 MG 24 hr capsule [[id number]]    Overridden by Antionette Robles, NP on Jun 5, 2023 8:00 AM   Drug-Drug   1. TRAZODONE / SEROTONIN/NOREPINEPHRINE REUPTAKE INHIBITORS [Level: Major]   Other Orders: traZODone (DESYREL) 100 MG tablet

## 2023-12-13 ENCOUNTER — OFFICE VISIT (OUTPATIENT)
Dept: FAMILY MEDICINE | Facility: CLINIC | Age: 63
End: 2023-12-13
Payer: COMMERCIAL

## 2023-12-13 VITALS
OXYGEN SATURATION: 99 % | DIASTOLIC BLOOD PRESSURE: 86 MMHG | HEART RATE: 73 BPM | SYSTOLIC BLOOD PRESSURE: 136 MMHG | HEIGHT: 63 IN | WEIGHT: 121 LBS | TEMPERATURE: 98.3 F | BODY MASS INDEX: 21.44 KG/M2

## 2023-12-13 DIAGNOSIS — T78.40XA ALLERGIC REACTION, INITIAL ENCOUNTER: Primary | ICD-10-CM

## 2023-12-13 NOTE — PROGRESS NOTES
"Keisha Thakur is a 63 year old female who presents today for increased allergy type symptoms.  She and her  are completing work on a new home and due to the chemicals and other things she is not even able to enter the home.  She feels her tongue reacting, it feels \"itchy,\" her throat and eyes feel itchy.  She has recently gone to a hardware store and had to leave due to similar symptoms but worse, she had to leave the store as she started having this feeling of shortness of breath.  She is becoming quite concerned because her symptoms are escalating.      Keisha had allergy testing in [place] 3 years ago, it was the conventional type so she is not sure what exactly was tested.  She was told she had an inflamed esophagus.  She has a history of IBS-C, she can go 8 days without a bowel movement.      Keisha's mother  8 weeks ago, there are significant dynamics surrounding that loss and a partner (of her mother's) and Keisha would like to get  therapy to discuss    Review Of Systems   ROS: 10 point ROS neg other than the symptoms noted above in the HPI.    Past Medical History:   Diagnosis Date     Anxiety      Depression      IBS (irritable bowel syndrome)      Meningioma (H)      Post-menopause      Uncomplicated asthma      Past Surgical History:   Procedure Laterality Date     AS CAUTERY ANAL FISSURE,INITIAL       BREAST EXCISIONAL BIOPSY       REPAIR HAMMER TOE       Social History     Socioeconomic History     Marital status:      Spouse name: Not on file     Number of children: Not on file     Years of education: Not on file     Highest education level: Not on file   Occupational History     Not on file   Tobacco Use     Smoking status: Never     Smokeless tobacco: Never   Vaping Use     Vaping Use: Never used   Substance and Sexual Activity     Alcohol use: Yes     Comment: 7/week, wine     Drug use: Never     Sexual activity: Not Currently     Partners: Male   Other Topics Concern     Not on " "file   Social History Narrative     Not on file     Social Determinants of Health     Financial Resource Strain: Low Risk  (12/12/2023)    Financial Resource Strain      Within the past 12 months, have you or your family members you live with been unable to get utilities (heat, electricity) when it was really needed?: No   Food Insecurity: Low Risk  (12/12/2023)    Food Insecurity      Within the past 12 months, did you worry that your food would run out before you got money to buy more?: No      Within the past 12 months, did the food you bought just not last and you didn t have money to get more?: No   Transportation Needs: Low Risk  (12/12/2023)    Transportation Needs      Within the past 12 months, has lack of transportation kept you from medical appointments, getting your medicines, non-medical meetings or appointments, work, or from getting things that you need?: No   Physical Activity: Not on file   Stress: Not on file   Social Connections: Not on file   Interpersonal Safety: Not on file   Housing Stability: Low Risk  (12/12/2023)    Housing Stability      Do you have housing? : Yes      Are you worried about losing your housing?: No     Family History   Problem Relation Age of Onset     Osteoporosis Mother      Hypertension Mother      Myocardial Infarction Mother      Lung Cancer Father      Kidney Disease Maternal Grandmother      Myocardial Infarction Brother        /86   Pulse 73   Temp 98.3  F (36.8  C) (Oral)   Ht 1.59 m (5' 2.6\")   Wt 54.9 kg (121 lb)   LMP 03/01/2015   SpO2 99%   BMI 21.71 kg/m      Exam:  Constitutional: alert and mild distress  Head: Normocephalic. No masses, lesions, tenderness or abnormalities  Neck: Neck supple. No adenopathy. Thyroid symmetric, normal size,, Carotids without bruits.  ENT: ENT exam normal, no neck nodes or sinus tenderness, throat pebbly with clear mucus  Cardiovascular: negative, PMI normal. No lifts, heaves, or thrills. RRR. No murmurs, clicks " gallops or rub  Respiratory: negative, Percussion normal. Good diaphragmatic excursion. Lungs clear  Psychiatric: mentation appears normal and affect normal/bright  Hematologic/Lymphatic/Immunologic: Normal cervical lymph nodes    Assessment/Plan:  1. Allergic reaction, initial encounter    - Adult Allergy/Asthma  Referral  yrtec 10 at bedtime    I gave Keisha the name/number of mental health therapists:  [facility]    We discussed the possibility of getting an EpiPen with the symptoms noted, she does not want one now but will let me know if she has further worsening symptoms  We discussed GERD associated with the inflamed esophagus, I explained to her that it could be from that and not be terribly symptomatic        Options for treatment and follow-up care were reviewed with the patient. Patient engaged in the decision making process and verbalized understanding of the options discussed and agreed with the final plan.

## 2023-12-13 NOTE — NURSING NOTE
"ROOM:2  [facility]    Preferred Name: Keisha     How did you hear about us?  Current Patient    63 year old  Chief Complaint   Patient presents with     Allergies     Increased allergies   Eyes get itchy   Feels like tongue is a always inflamed and itchy   Feels it could be leaky gut   IBS symptoms worsening        Blood pressure 136/86, pulse 73, temperature 98.3  F (36.8  C), temperature source Oral, height 1.59 m (5' 2.6\"), weight 54.9 kg (121 lb), last menstrual period 03/01/2015, SpO2 99%. Body mass index is 21.71 kg/m .  BP completed using cuff size:        There is no problem list on file for this patient.      Wt Readings from Last 2 Encounters:   12/13/23 54.9 kg (121 lb)   09/01/23 52.6 kg (116 lb)     BP Readings from Last 3 Encounters:   12/13/23 136/86   09/01/23 128/78   04/18/23 110/73       No Known Allergies    Current Outpatient Medications   Medication     Calcium Carb-Cholecalciferol (CALCIUM 1000 + D PO)     cholecalciferol 25 MCG (1000 UT) TABS     escitalopram (LEXAPRO) 20 MG tablet     estradiol (ESTRACE) 1 MG tablet     multivitamin w/minerals (THERA-VIT-M) tablet     progesterone (PROMETRIUM) 200 MG capsule     traZODone (DESYREL) 100 MG tablet     venlafaxine (EFFEXOR XR) 75 MG 24 hr capsule     No current facility-administered medications for this visit.       Social History     Tobacco Use     Smoking status: Never     Smokeless tobacco: Never   Vaping Use     Vaping Use: Never used   Substance Use Topics     Alcohol use: Yes     Comment: 7/week, wine     Drug use: Never       Honoring Choices - Health Care Directive Guide offered to patient at time of visit.    Health Maintenance Due   Topic Date Due     ADVANCE CARE PLANNING  Never done     COLORECTAL CANCER SCREENING  Never done     HIV SCREENING  Never done     HEPATITIS C SCREENING  Never done     ZOSTER IMMUNIZATION (1 of 2) Never done     DTAP/TDAP/TD IMMUNIZATION (2 - Td or Tdap) 05/08/2022     RSV VACCINE (Pregnancy & 60+) " "(1 - 1-dose 60+ series) Never done     PAP  07/08/2022     MAMMO SCREENING  03/03/2023     INFLUENZA VACCINE (1) 09/01/2023     COVID-19 Vaccine (3 - 2023-24 season) 09/01/2023       Immunization History   Administered Date(s) Administered     COVID-19 Bivalent 12+ (Pfizer) 10/28/2022     COVID-19 Monovalent 12+ (Pfizer 2022) 03/17/2022       No results found for: \"PAP\"    Recent Labs   Lab Test 03/14/23  1538 08/24/22  1451 03/11/22  0832   A1C 5.4  --   --    LDL  --   --  48   HDL  --   --  92   TRIG  --   --  83   ALT 15  --  16   CR 0.61  --  0.74   GFRESTIMATED >90  --  >90   ALBUMIN 4.5  --  3.5   POTASSIUM 4.0 4.5 5.5*   TSH 3.54 3.62 6.20*           3/14/2023     2:18 PM 10/7/2022    12:37 PM   PHQ-2 ( 1999 Pfizer)   Q1: Little interest or pleasure in doing things 0 0   Q2: Feeling down, depressed or hopeless 0 0   PHQ-2 Score 0 0           3/8/2022    11:23 AM 8/2/2022     9:22 AM 4/18/2023    10:03 AM   PHQ-9 SCORE   PHQ-9 Total Score 3 2 14           8/2/2022     9:22 AM 3/14/2023     2:18 PM 4/18/2023    10:03 AM   ANDREA-7 SCORE   Total Score 2 5 14            No data to display                Rolo Lowe    December 13, 2023 2:30 PM    "

## 2024-02-01 ENCOUNTER — VIRTUAL VISIT (OUTPATIENT)
Dept: FAMILY MEDICINE | Facility: CLINIC | Age: 64
End: 2024-02-01
Payer: COMMERCIAL

## 2024-02-01 DIAGNOSIS — R42 VERTIGO: ICD-10-CM

## 2024-02-01 DIAGNOSIS — D32.9 MENINGIOMA (H): Primary | ICD-10-CM

## 2024-02-01 NOTE — PROGRESS NOTES
Keisha is a 63 year old who is being evaluated via a billable telephone visit.      What phone number would you like to be contacted at? [phone]  How would you like to obtain your AVS? [place]    Distant Location (provider location):  On-site    Subjective   Keisha is a 63 year old, presenting for the following health issues:    Keisha was diagnosed with a brain meningioma in .  She had regular follow up until 2 years ago.  She was doing well.  She had her last MRI in , it showed the meningioma.  She has not had a neurologist follow up since moving from [place], [place] previous to moving to [place], previous to .  Apparently there was a MRI and neurology follow up ordered, those orders have subsequently , she did not get in for those.    Keisha is interested in both an MRI and a referral to neurology.    Review of Systems  CONSTITUTIONAL: NEGATIVE for fever, chills, change in weight  ENT/MOUTH: NEGATIVE for ear, mouth and throat problems  RESP: NEGATIVE for significant cough or SOB  CV: NEGATIVE for chest pain, palpitations or peripheral edema      Objective       Vitals:  No vitals were obtained today due to virtual visit.    Physical Exam   General: Alert and no distress //Respiratory: No audible wheeze, cough, or shortness of breath // Psychiatric:  Appropriate affect, tone, and pace of words    (D32.9) Meningioma (H)  (primary encounter diagnosis)    Plan: MR Brain w/o & w Contrast, Adult Neurology          Referral      (R42) Vertigo    Plan: MR Brain w/o & w Contrast, Adult Neurology          Referral    Keisha will update me as to when her appointment is scheduled.          Phone call duration: 8 minutes  Signed Electronically by: Antionette Robles NP

## 2024-02-05 DIAGNOSIS — D32.9 MENINGIOMA (H): Primary | ICD-10-CM

## 2024-02-29 DIAGNOSIS — F41.9 ANXIETY AND DEPRESSION: ICD-10-CM

## 2024-02-29 DIAGNOSIS — F32.A ANXIETY AND DEPRESSION: ICD-10-CM

## 2024-03-07 ENCOUNTER — ANCILLARY PROCEDURE (OUTPATIENT)
Dept: MRI IMAGING | Facility: CLINIC | Age: 64
End: 2024-03-07
Attending: NURSE PRACTITIONER
Payer: COMMERCIAL

## 2024-03-07 DIAGNOSIS — R42 VERTIGO: ICD-10-CM

## 2024-03-07 DIAGNOSIS — D32.9 MENINGIOMA (H): ICD-10-CM

## 2024-03-07 PROCEDURE — 70553 MRI BRAIN STEM W/O & W/DYE: CPT

## 2024-03-07 PROCEDURE — A9585 GADOBUTROL INJECTION: HCPCS | Performed by: NURSE PRACTITIONER

## 2024-03-07 PROCEDURE — 255N000002 HC RX 255 OP 636: Performed by: NURSE PRACTITIONER

## 2024-03-07 RX ORDER — GADOBUTROL 604.72 MG/ML
6 INJECTION INTRAVENOUS ONCE
Status: COMPLETED | OUTPATIENT
Start: 2024-03-07 | End: 2024-03-07

## 2024-03-07 RX ORDER — VENLAFAXINE HYDROCHLORIDE 75 MG/1
75 CAPSULE, EXTENDED RELEASE ORAL DAILY
Qty: 90 CAPSULE | Refills: 1 | Status: SHIPPED | OUTPATIENT
Start: 2024-03-07 | End: 2024-05-06

## 2024-03-07 RX ADMIN — GADOBUTROL 6 ML: 604.72 INJECTION INTRAVENOUS at 12:42

## 2024-03-14 ENCOUNTER — OFFICE VISIT (OUTPATIENT)
Dept: ALLERGY | Facility: CLINIC | Age: 64
End: 2024-03-14
Attending: NURSE PRACTITIONER
Payer: COMMERCIAL

## 2024-03-14 VITALS
HEART RATE: 76 BPM | WEIGHT: 118.1 LBS | BODY MASS INDEX: 21.19 KG/M2 | SYSTOLIC BLOOD PRESSURE: 122 MMHG | RESPIRATION RATE: 16 BRPM | OXYGEN SATURATION: 100 % | DIASTOLIC BLOOD PRESSURE: 84 MMHG

## 2024-03-14 DIAGNOSIS — L29.9 PRURITUS: ICD-10-CM

## 2024-03-14 DIAGNOSIS — T78.40XA ALLERGIC REACTION, INITIAL ENCOUNTER: Primary | ICD-10-CM

## 2024-03-14 DIAGNOSIS — T78.1XXA ADVERSE FOOD REACTION, INITIAL ENCOUNTER: ICD-10-CM

## 2024-03-14 PROCEDURE — 99203 OFFICE O/P NEW LOW 30 MIN: CPT | Performed by: INTERNAL MEDICINE

## 2024-03-14 NOTE — PATIENT INSTRUCTIONS
Will check labs  Zyrtec (cetirizine) 10 mg at night       Allergy Staff Appt Hours Shot Hours Location       Physician   Saud Lisa MD      Support Staff   JADE Staples MA Emily J., MA      Mondays Tuesdays Thursdays and Fridays:      Herlinda 7-5      Wednesdays         Close                Mondays, Tuesdays and Fridays:  7:20 - 3:40              [facility]  [address]  [place] [address]  Allergy appointment  line: [phone]    Pulmonary Function Scheduling:  [place]: [phone]           Questions about cost of your care  For questions about your cost of your visit, procedure, lab or imaging contact: [email or web address] Consumer Price Line [phone] or visit:  [email or web address]    Prescription Assistance  If you need assistance with your prescriptions (cost, coverage, etc) please contact: [employer] Prescription Assistance Program [phone]    Important Scheduling Information  All visits for food challenges, medication/drug allergy testing, and drug challenges MUST be scheduled through the allergy clinic nurse. Please contact them via [employer] or by calling the clinic at [phone] and asking to speak with an allergy nurse. They will provide additional information and instructions for the appointment. Discontinue oral antihistamines 7 days prior to the appointment. Discontinue nasal and ocular antihistamines 1 day prior to the appointment.    Appointments for skin testing: Appointment will last approximately 45 minutes.  Please call the appointment line for your clinic to schedule.  Discontinue oral antihistamines 7 days prior to the appointment.  Discontinue nasal and ocular antihistamines 1 days prior to appointment.    Thank you for trusting us with your care. Please feel free to contact us with any questions or concerns you may have.

## 2024-03-14 NOTE — LETTER
3/14/2024         RE: Keisha Thakur  [address] [address]        Dear Colleague,    Thank you for referring your patient, Keisha Thakur, to the [facility] [place]. Please see a copy of my visit note below.    Keisha Thakur was seen in the Allergy Clinic at [facility].    Keisha Thakur is a 63 year old female being seen today at the request of Antionette Robles NP, [facility] in consultation for concerns for [facility]  She moved from [place] to [place] a year and a half ago.  There is a note from 2012 that allergy testing from another allergist in [place] was negative.    Her biggest concern is symptoms while in the [facility] which she describes as tongue itching and eye itching.  She does describe how 70% the time her tongue will itch.  She was recently in [place] and her symptoms essentially resolved.    When she was a child she had symptoms related to turpentine.    She has tried Zyrtec a handful of times and it does provide some partial benefit.    When she eats tree nuts she will get some itching of her tongue and chest tightness.  She also avoids dairy and wheat due to abdominal symptoms.    She moved into the [facility] 1 week ago.  The remodel was completed in September.  Thus she avoided moving in 4 months based on the symptoms that would develop when she went into the [facility].  This would include watery eyes, chest tightness, tongue itching.    Past Medical History:   Diagnosis Date     Anxiety      Depression      IBS (irritable bowel syndrome)      Meningioma (H)      Post-menopause      Uncomplicated asthma      Family History   Problem Relation Age of Onset     Osteoporosis Mother      Hypertension Mother      Myocardial Infarction Mother      Lung Cancer Father      Kidney Disease Maternal Grandmother      Myocardial Infarction Brother      Past Surgical  History:   Procedure Laterality Date     AS CAUTERY ANAL FISSURE,INITIAL       BREAST EXCISIONAL BIOPSY       REPAIR HAMMER TOE         ENVIRONMENTAL HISTORY:   Pets inside the house include None.  Do you smoke cigarettes or other recreational drugs? No There is/are 0 smokers living in the house. The house does not have a damp basement.     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      Review of Systems      Current Outpatient Medications:      Calcium Carb-Cholecalciferol (CALCIUM 1000 + D PO), , Disp: , Rfl:      cholecalciferol 25 MCG (1000 UT) TABS, Take 400 Units by mouth, Disp: , Rfl:      estradiol (ESTRACE) 1 MG tablet, Take 1 tablet (1 mg) by mouth daily, Disp: 90 tablet, Rfl: 3     multivitamin w/minerals (THERA-VIT-M) tablet, Take 1 tablet by mouth daily, Disp: , Rfl:      progesterone (PROMETRIUM) 200 MG capsule, Take 1 capsule (200 mg) by mouth daily, Disp: 90 capsule, Rfl: 3     traZODone (DESYREL) 100 MG tablet, Take 100 mg at bedtime as needed, Disp: 90 tablet, Rfl: 3     venlafaxine (EFFEXOR XR) 75 MG 24 hr capsule, Take 1 capsule (75 mg) by mouth daily, Disp: 90 capsule, Rfl: 1     escitalopram (LEXAPRO) 20 MG tablet, Take 1 tablet (20 mg) by mouth daily (Patient not taking: Reported on 3/14/2024), Disp: 90 tablet, Rfl: 3  Allergies   Allergen Reactions     Turpentine Oil Itching     Gets lumps in the mouth and itchy tongue, trouble breathing         EXAM:   /84 (BP Location: Left arm, Patient Position: Sitting, Cuff Size: Adult Small)   Pulse 76   Resp 16   Wt 53.6 kg (118 lb 1.6 oz)   LMP 03/01/2015   SpO2 100%   BMI 21.19 kg/m      Physical Exam    Constitutional:       General: She is not in acute distress.     Appearance: Normal appearance. She is not ill-appearing.   HENT:      Head: Normocephalic and atraumatic.      Nose: Nose normal. No congestion or rhinorrhea.      Mouth/Throat:      Mouth: Mucous membranes are moist.      Pharynx:  Oropharynx is clear. No posterior oropharyngeal erythema.   Eyes:      General:         Right eye: No discharge.         Left eye: No discharge.   Cardiovascular:      Rate and Rhythm: Normal rate and regular rhythm.      Heart sounds: Normal heart sounds.   Pulmonary:      Effort: Pulmonary effort is normal.      Breath sounds: Normal breath sounds. No wheezing or rhonchi.   Skin:     General: Skin is warm.      Findings: No erythema or rash.   Neurological:      General: No focal deficit present.      Mental Status: She is alert. Mental status is at baseline.   Psychiatric:         Mood and Affect: Mood normal.         Behavior: Behavior normal.        ASSESSMENT/PLAN:  Keisha Thakur is a 63 year old female seen today for tongue itching and concern [facility].  Also has symptoms increased with tree nuts.  Will do blood testing since her skin testing is blocked potentially related to trazodone or recent antihistamine.  There is not any specific testing available for the chemicals in the [facility] that she is concerned she may be exposed.  She moved in the last week and has had mild increase in symptoms.  She will be contacted with the results once available.  Recommend taking a daily antihistamine to see if this helps prevent her itching.    Will check labs  Zyrtec (cetirizine) 10 mg at night     Follow-up to be determined      Thank you for allowing me to participate in the care of Keisha Thakur.      I spent 42 minutes on the date of the encounter doing chart review, history and exam, documentation and further coordination as noted above exclusive of separately reported interpretations    Saud Lisa MD  Allergy/Immunology  [facility]         Again, thank you for allowing me to participate in the care of your patient.        Sincerely,        Saud Lisa MD

## 2024-03-14 NOTE — PROGRESS NOTES
Keisha Thakur was seen in the Allergy Clinic at [facility].    Keisha Thakur is a 63 year old female being seen today at the request of Antionette Robles NP, [facility] in consultation for concerns for allergies in relation to her townhome that was recently remodeled.  She moved from [place] to [place] a year and a half ago.  There is a note from 2012 that allergy testing from another allergist in [place] was negative.    Her biggest [facility] which she describes as tongue itching and eye itching.  She does describe how 70% the time her tongue will itch.  She was recently in [place] and her symptoms essentially resolved.    When she was a child she had symptoms related to turpentine.    She has tried Zyrtec a handful of times and it does provide some partial benefit.    When she eats tree nuts she will get some itching of her tongue and chest tightness.  She also avoids dairy and wheat due to abdominal symptoms.    She moved into the [facility] 1 week ago.  The remodel was completed in September.  Thus she avoided moving in 4 months based on the symptoms that would develop when she went into the [facility].  This would include watery eyes, chest tightness, tongue itching.    Past Medical History:   Diagnosis Date    Anxiety     Depression     IBS (irritable bowel syndrome)     Meningioma (H)     Post-menopause     Uncomplicated asthma      Family History   Problem Relation Age of Onset    Osteoporosis Mother     Hypertension Mother     Myocardial Infarction Mother     Lung Cancer Father     Kidney Disease Maternal Grandmother     Myocardial Infarction Brother      Past Surgical History:   Procedure Laterality Date    AS CAUTERY ANAL FISSURE,INITIAL      BREAST EXCISIONAL BIOPSY      REPAIR HAMMER TOE         ENVIRONMENTAL HISTORY:   Pets inside the house include None.  Do you smoke cigarettes or other recreational drugs? No There is/are 0 smokers  living in the house. The house does not have a damp basement.     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      Review of Systems      Current Outpatient Medications:     Calcium Carb-Cholecalciferol (CALCIUM 1000 + D PO), , Disp: , Rfl:     cholecalciferol 25 MCG (1000 UT) TABS, Take 400 Units by mouth, Disp: , Rfl:     estradiol (ESTRACE) 1 MG tablet, Take 1 tablet (1 mg) by mouth daily, Disp: 90 tablet, Rfl: 3    multivitamin w/minerals (THERA-VIT-M) tablet, Take 1 tablet by mouth daily, Disp: , Rfl:     progesterone (PROMETRIUM) 200 MG capsule, Take 1 capsule (200 mg) by mouth daily, Disp: 90 capsule, Rfl: 3    traZODone (DESYREL) 100 MG tablet, Take 100 mg at bedtime as needed, Disp: 90 tablet, Rfl: 3    venlafaxine (EFFEXOR XR) 75 MG 24 hr capsule, Take 1 capsule (75 mg) by mouth daily, Disp: 90 capsule, Rfl: 1    escitalopram (LEXAPRO) 20 MG tablet, Take 1 tablet (20 mg) by mouth daily (Patient not taking: Reported on 3/14/2024), Disp: 90 tablet, Rfl: 3  Allergies   Allergen Reactions    Turpentine Oil Itching     Gets lumps in the mouth and itchy tongue, trouble breathing         EXAM:   /84 (BP Location: Left arm, Patient Position: Sitting, Cuff Size: Adult Small)   Pulse 76   Resp 16   Wt 53.6 kg (118 lb 1.6 oz)   LMP 03/01/2015   SpO2 100%   BMI 21.19 kg/m      Physical Exam    Constitutional:       General: She is not in acute distress.     Appearance: Normal appearance. She is not ill-appearing.   HENT:      Head: Normocephalic and atraumatic.      Nose: Nose normal. No congestion or rhinorrhea.      Mouth/Throat:      Mouth: Mucous membranes are moist.      Pharynx: Oropharynx is clear. No posterior oropharyngeal erythema.   Eyes:      General:         Right eye: No discharge.         Left eye: No discharge.   Cardiovascular:      Rate and Rhythm: Normal rate and regular rhythm.      Heart sounds: Normal heart sounds.   Pulmonary:      Effort:  Pulmonary effort is normal.      Breath sounds: Normal breath sounds. No wheezing or rhonchi.   Skin:     General: Skin is warm.      Findings: No erythema or rash.   Neurological:      General: No focal deficit present.      Mental Status: She is alert. Mental status is at baseline.   Psychiatric:         Mood and Affect: Mood normal.         Behavior: Behavior normal.        ASSESSMENT/PLAN:  Keisha Thakur is a 63 year old female seen today for tongue itching and concern for potential chemical within the [facility] contributing to her symptoms.  Also has symptoms increased with tree nuts.  Will do blood testing since her skin testing is blocked potentially related to trazodone or recent antihistamine.  There is not any specific testing available for the chemicals in the [facility] that she is concerned she may be exposed.  She moved in the last week and has had mild increase in symptoms.  She will be contacted with the results once available.  Recommend taking a daily antihistamine to see if this helps prevent her itching.    Will check labs  Zyrtec (cetirizine) 10 mg at night     Follow-up to be determined      Thank you for allowing me to participate in the care of Keisha Thaukr.      I spent 42 minutes on the date of the encounter doing chart review, history and exam, documentation and further coordination as noted above exclusive of separately reported interpretations    Saud Lisa MD  Allergy/Immunology  [facility]

## 2024-03-18 ENCOUNTER — LAB (OUTPATIENT)
Dept: LAB | Facility: CLINIC | Age: 64
End: 2024-03-18
Payer: COMMERCIAL

## 2024-03-18 ENCOUNTER — TELEPHONE (OUTPATIENT)
Dept: NEUROSURGERY | Facility: CLINIC | Age: 64
End: 2024-03-18

## 2024-03-18 DIAGNOSIS — L29.9 PRURITUS: ICD-10-CM

## 2024-03-18 DIAGNOSIS — T78.40XA ALLERGIC REACTION, INITIAL ENCOUNTER: ICD-10-CM

## 2024-03-18 PROCEDURE — 86003 ALLG SPEC IGE CRUDE XTRC EA: CPT

## 2024-03-18 PROCEDURE — 36415 COLL VENOUS BLD VENIPUNCTURE: CPT

## 2024-03-18 NOTE — TELEPHONE ENCOUNTER
Left Voicemail (1st Attempt) for the patient to call back and schedule the following:    Appointment type: Rtn Tumor  Provider: Emilee Eagle  Return date: Next available  Specialty phone number: [phone]  Additional appointment(s) needed: N/A  Additonal Notes: L/m for patient to schedule a rtn tumor in person or virtual appointment w/ Emilee Eagle. -KB

## 2024-03-19 ENCOUNTER — VIRTUAL VISIT (OUTPATIENT)
Dept: NEUROSURGERY | Facility: CLINIC | Age: 64
End: 2024-03-19
Payer: COMMERCIAL

## 2024-03-19 VITALS — HEIGHT: 63 IN | WEIGHT: 118 LBS | BODY MASS INDEX: 20.91 KG/M2

## 2024-03-19 DIAGNOSIS — D32.9 MENINGIOMA (H): Primary | ICD-10-CM

## 2024-03-19 PROCEDURE — 99213 OFFICE O/P EST LOW 20 MIN: CPT | Mod: 95 | Performed by: PHYSICIAN ASSISTANT

## 2024-03-19 ASSESSMENT — PAIN SCALES - GENERAL: PAINLEVEL: NO PAIN (0)

## 2024-03-19 NOTE — NURSING NOTE
Is the patient currently in the state of [place]? YES    Visit mode:VIDEO    If the visit is dropped, the patient can be reconnected by: VIDEO VISIT: Text to cell phone:   Telephone Information:   Mobile [phone]       Will anyone else be joining the visit? NO  (If patient encounters technical issues they should call [phone]    How would you like to obtain your AVS? MyChart    Are changes needed to the allergy or medication list? No    Reason for visit: Follow Up    Nichol BROWNLEE

## 2024-03-19 NOTE — PROGRESS NOTES
"Virtual Visit Details    Type of service:  Video Visit     Originating Location (pt. Location): Home    Distant Location (provider location):  Off-site  Platform used for Video Visit: Cognitive Networks  Video start time: 12:04P  Video end time: 12:14P      [facility]  Department of Neurosurgery  Center for Skull Base and Pituitary Surgery    Name: Keisha Thakur  MRN: [id number]  Age: 63 year old  : 2024      Chief Complaint:   Right sphenoid wing meningioma, follow up visit    History of Present Illness:   Keisha Thakur is a 63 year old female with a history of mild asthma who is seen today by video visit for annual follow up. We initially met 10/2022 regarding an incidentally discovered meningioma which the patient had been following with her providers in [place] since . This was discovered on workup for mental fogginess. She has been feeling well since our last visit though has developed intermittent vertigo in the past 6 months. This has occurred infrequently and is sometimes vertiginous in nature and sometimes simply dizziness or lightheadedness. Her Primary Care provider encouraged her to have an updated MRI done given her history of meningioma. She has been doing BPPV exercises her friend provided her; she's unsure if these are helpful. She denies new symptoms otherwise.    Review of Systems:   Pertinent items are noted in HPI or as in patient entered ROS below, remainder of complete ROS is negative.     Physical Exam:   Ht 1.59 m (5' 2.6\")   Wt 53.5 kg (118 lb)   LMP 2015   BMI 21.17 kg/m     Exam today is limited secondary to video visit. Speech is normal. Face appears symmetric.     Imaging:  We reviewed the MRI from 3/7/2024. This shows a stable right sphenoid wing dural based lesion compared to previous.      Assessment:  Right sphenoid wing meningioma, follow up visit    Plan:  We reviewed the MRI results today by video which are largely stable. She'd like to continue to " observe. Given the stability over years, it's reasonable to see her back in 2 years with a repeat MRI. She was encouraged to reach out sooner with any new concerns or symptoms.    In terms of her vertigo/dizziness, it's unlikely related to her right sphenoid wing meningioma. She has not had any symptoms indicative of seizure. I recommended she follow up with her PCP if this worsens or becomes particularly bothersome or more frequent, and could consider ENT evaluation as needed. She agrees.        Emilee Eagle PA-C  Department of Neurosurgery

## 2024-03-19 NOTE — LETTER
"3/19/2024       RE: Keisha Thakur  [address] [address]     Dear Colleague,    Thank you for referring your patient, Keisha Thakur, to the [facility] [place] at [facility]. Please see a copy of my visit note below.    Virtual Visit Details    Type of service:  Video Visit     Originating Location (pt. Location): Home    Distant Location (provider location):  Off-site  Platform used for Video Visit: SlideBatch  Video start time: 12:04P  Video end time: 12:14P      [facility]  Department of Neurosurgery  Center for Skull Base and Pituitary Surgery    Name: Keisha Thakur  MRN: [id number]  Age: 63 year old  : 2024      Chief Complaint:   Right sphenoid wing meningioma, follow up visit    History of Present Illness:   Keisha Thakur is a 63 year old female with a history of mild asthma who is seen today by video visit for annual follow up. We initially met 10/2022 regarding an incidentally discovered meningioma which the patient had been following with her providers in [place] since . This was discovered on workup for mental fogginess. She has been feeling well since our last visit though has developed intermittent vertigo in the past 6 months. This has occurred infrequently and is sometimes vertiginous in nature and sometimes simply dizziness or lightheadedness. Her Primary Care provider encouraged her to have an updated MRI done given her history of meningioma. She has been doing BPPV exercises her friend provided her; she's unsure if these are helpful. She denies new symptoms otherwise.    Review of Systems:   Pertinent items are noted in HPI or as in patient entered ROS below, remainder of complete ROS is negative.     Physical Exam:   Ht 1.59 m (5' 2.6\")   Wt 53.5 kg (118 lb)   LMP 2015   BMI 21.17 kg/m     Exam today is limited secondary to video visit. Speech is normal. Face " appears symmetric.     Imaging:  We reviewed the MRI from 3/7/2024. This shows a stable right sphenoid wing dural based lesion compared to previous.      Assessment:  Right sphenoid wing meningioma, follow up visit    Plan:  We reviewed the MRI results today by video which are largely stable. She'd like to continue to observe. Given the stability over years, it's reasonable to see her back in 2 years with a repeat MRI. She was encouraged to reach out sooner with any new concerns or symptoms.    In terms of her vertigo/dizziness, it's unlikely related to her right sphenoid wing meningioma. She has not had any symptoms indicative of seizure. I recommended she follow up with her PCP if this worsens or becomes particularly bothersome or more frequent, and could consider ENT evaluation as needed. She agrees.          Again, thank you for allowing me to participate in the care of your patient.      Sincerely,    Emilee Eagle PA-C

## 2024-03-19 NOTE — PATIENT INSTRUCTIONS
Return in 2 years, with brain MRI w/wo contrast prior to appointment.  Please reach out if you have any new symptoms or concerns in the meantime.

## 2024-03-22 LAB
A ALTERNATA IGE QN: <0.1 KU(A)/L
A FUMIGATUS IGE QN: <0.1 KU(A)/L
ALMOND IGE QN: <0.1 KU(A)/L
BRAZIL NUT IGE QN: <0.1 KU(A)/L
C HERBARUM IGE QN: <0.1 KU(A)/L
CALIF WALNUT POLN IGE QN: <0.1 KU(A)/L
CASHEW NUT IGE QN: <0.1 KU(A)/L
CAT DANDER IGG QN: <0.1 KU(A)/L
CEDAR IGE QN: <0.1 KU(A)/L
COMMON RAGWEED IGE QN: <0.1 KU(A)/L
COTTONWOOD IGE QN: <0.1 KU(A)/L
D FARINAE IGE QN: <0.1 KU(A)/L
D PTERONYSS IGE QN: <0.1 KU(A)/L
DOG DANDER+EPITH IGE QN: <0.1 KU(A)/L
E PURPURASCENS IGE QN: <0.1 KU(A)/L
EAST WHITE PINE IGE QN: <0.1 KU(A)/L
ENGL PLANTAIN IGE QN: <0.1 KU(A)/L
FIREBUSH IGE QN: <0.1 KU(A)/L
GIANT RAGWEED IGE QN: <0.1 KU(A)/L
GOOSEFOOT IGE QN: <0.1 KU(A)/L
HAZELNUT IGE QN: <0.1 KU(A)/L
JOHNSON GRASS IGE QN: <0.1 KU(A)/L
MACADAMIA IGE QN: <0.1 KU(A)/L
MAPLE IGE QN: <0.1 KU(A)/L
MUGWORT IGE QN: <0.1 KU(A)/L
NETTLE IGE QN: <0.1 KU(A)/L
P NOTATUM IGE QN: <0.1 KU(A)/L
PECAN/HICK NUT IGE QN: <0.1 KU(A)/L
PINE NUT IGE QN: <0.1 KU(A)/L
PISTACHIO IGE QN: <0.1 KU(A)/L
RED MULBERRY IGE QN: <0.1 KU(A)/L
SALTWORT IGE QN: <0.1 KU(A)/L
SESAME SEED IGE QN: <0.1 KU(A)/L
SHEEP SORREL IGE QN: <0.1 KU(A)/L
SILVER BIRCH IGE QN: <0.1 KU(A)/L
TIMOTHY IGE QN: <0.1 KU(A)/L
WALNUT IGE QN: <0.1 KU(A)/L
WHITE ASH IGE QN: <0.1 KU(A)/L
WHITE ELM IGE QN: <0.1 KU(A)/L
WHITE MULBERRY IGE QN: <0.1 KU(A)/L
WHITE OAK IGE QN: <0.1 KU(A)/L
WORMWOOD IGE QN: <0.1 KU(A)/L

## 2024-05-06 ENCOUNTER — TELEPHONE (OUTPATIENT)
Dept: FAMILY MEDICINE | Facility: CLINIC | Age: 64
End: 2024-05-06

## 2024-05-06 DIAGNOSIS — F32.A ANXIETY AND DEPRESSION: ICD-10-CM

## 2024-05-06 DIAGNOSIS — F41.9 ANXIETY AND DEPRESSION: ICD-10-CM

## 2024-05-06 RX ORDER — VENLAFAXINE HYDROCHLORIDE 75 MG/1
75 CAPSULE, EXTENDED RELEASE ORAL DAILY
Qty: 90 CAPSULE | Refills: 1 | Status: SHIPPED | OUTPATIENT
Start: 2024-05-06

## 2024-05-06 NOTE — TELEPHONE ENCOUNTER
Patient lost her prescription for venlafaxine (effexor) 75 MG 24 hr capsule in the move. She is asking if you can send in a refill for her to the "[facility] drug store in [place]   DONNIE Colon 1:23 PM 5/6/2024

## 2024-05-07 NOTE — PROGRESS NOTES
Keisha is a 63 year old who is being evaluated via a billable telephone visit.    What phone number would you like to be contacted at? [phone]  How would you like to obtain your AVS? [place]  Originating Location (pt. Location): Home    Distant Location (provider location):  On-site    Subjective   Keisha is a 63 year old, presenting for the following health issues:    Keisha is due for a screening colonoscopy, she has had polyps in the past.  Twice she has gone in to have a colonoscopy and they were not able to do the procedure because the prep did not work as well as needed.  She is not having any GI symptoms at this time, she just would like to talk to someone about this situation.  She wasn't sure who to talk to or how to find a way to have the colonoscopy successfully.    Keisha is due for a health maintenance follow up, a Pap and mammogram.  She would like to schedule these things soon.       Review of Systems  CONSTITUTIONAL: NEGATIVE for fever, chills, change in weight  ENT/MOUTH: NEGATIVE for ear, mouth and throat problems  RESP: NEGATIVE for significant cough or SOB  CV: NEGATIVE for chest pain, palpitations or peripheral edema      Objective       Vitals:  No vitals were obtained today due to virtual visit.    Physical Exam   General: Alert and no distress //Respiratory: No audible wheeze, cough, or shortness of breath // Psychiatric:  Appropriate affect, tone, and pace of words    (Z98.890,  Z86.010) H/O colonoscopy with polypectomy  (primary encounter diagnosis)  Comment: A conversation with someone from the GI Team will be very helpful to discuss options for Keisha.  Plan: Adult GI  Referral - Consult Only  If further concerns she will let me know.    (Z00.00) Healthcare maintenance  Comment: Because Keisha has been told she has dense breasts I recommended that she discuss what she needs when they call from the [facility] to make the appointment.  Plan: Screening Mammogram Digital  Bilateral                Phone call duration: 8 minutes  Signed Electronically by: Antionette Robles NP

## 2024-05-08 ENCOUNTER — VIRTUAL VISIT (OUTPATIENT)
Dept: FAMILY MEDICINE | Facility: CLINIC | Age: 64
End: 2024-05-08
Payer: COMMERCIAL

## 2024-05-08 DIAGNOSIS — Z98.890 H/O COLONOSCOPY WITH POLYPECTOMY: Primary | ICD-10-CM

## 2024-05-08 DIAGNOSIS — Z86.0100 H/O COLONOSCOPY WITH POLYPECTOMY: Primary | ICD-10-CM

## 2024-05-08 DIAGNOSIS — Z00.00 HEALTHCARE MAINTENANCE: ICD-10-CM

## 2024-05-09 ENCOUNTER — PATIENT OUTREACH (OUTPATIENT)
Dept: GASTROENTEROLOGY | Facility: CLINIC | Age: 64
End: 2024-05-09
Payer: COMMERCIAL

## 2024-05-19 ENCOUNTER — HEALTH MAINTENANCE LETTER (OUTPATIENT)
Age: 64
End: 2024-05-19

## 2024-05-22 ENCOUNTER — TRANSFERRED RECORDS (OUTPATIENT)
Dept: HEALTH INFORMATION MANAGEMENT | Facility: CLINIC | Age: 64
End: 2024-05-22

## 2024-05-22 DIAGNOSIS — Z00.00 HEALTHCARE MAINTENANCE: Primary | ICD-10-CM

## 2024-05-29 ASSESSMENT — PATIENT HEALTH QUESTIONNAIRE - PHQ9
SUM OF ALL RESPONSES TO PHQ QUESTIONS 1-9: 0
SUM OF ALL RESPONSES TO PHQ QUESTIONS 1-9: 0
10. IF YOU CHECKED OFF ANY PROBLEMS, HOW DIFFICULT HAVE THESE PROBLEMS MADE IT FOR YOU TO DO YOUR WORK, TAKE CARE OF THINGS AT HOME, OR GET ALONG WITH OTHER PEOPLE: NOT DIFFICULT AT ALL

## 2024-06-01 DIAGNOSIS — F41.9 ANXIETY: ICD-10-CM

## 2024-06-01 DIAGNOSIS — N95.1 MENOPAUSAL SYNDROME (HOT FLASHES): ICD-10-CM

## 2024-06-03 ENCOUNTER — HOSPITAL ENCOUNTER (OUTPATIENT)
Dept: MAMMOGRAPHY | Facility: CLINIC | Age: 64
Discharge: HOME | End: 2024-06-03
Attending: NURSE PRACTITIONER | Admitting: NURSE PRACTITIONER
Payer: COMMERCIAL

## 2024-06-03 ENCOUNTER — OFFICE VISIT (OUTPATIENT)
Dept: FAMILY MEDICINE | Facility: CLINIC | Age: 64
End: 2024-06-03
Payer: COMMERCIAL

## 2024-06-03 VITALS
SYSTOLIC BLOOD PRESSURE: 125 MMHG | BODY MASS INDEX: 21.81 KG/M2 | DIASTOLIC BLOOD PRESSURE: 78 MMHG | OXYGEN SATURATION: 96 % | TEMPERATURE: 97.7 F | HEART RATE: 82 BPM | WEIGHT: 118.5 LBS | RESPIRATION RATE: 16 BRPM | HEIGHT: 62 IN

## 2024-06-03 DIAGNOSIS — D64.9 MILD ANEMIA: ICD-10-CM

## 2024-06-03 DIAGNOSIS — Z00.00 ROUTINE HISTORY AND PHYSICAL EXAMINATION OF ADULT: Primary | ICD-10-CM

## 2024-06-03 DIAGNOSIS — N94.10 DYSPAREUNIA IN FEMALE: ICD-10-CM

## 2024-06-03 DIAGNOSIS — Z00.00 HEALTHCARE MAINTENANCE: ICD-10-CM

## 2024-06-03 LAB
BASOPHILS # BLD AUTO: 0 10E3/UL (ref 0–0.2)
BASOPHILS NFR BLD AUTO: 0 %
EOSINOPHIL # BLD AUTO: 0.1 10E3/UL (ref 0–0.7)
EOSINOPHIL NFR BLD AUTO: 1 %
ERYTHROCYTE [DISTWIDTH] IN BLOOD BY AUTOMATED COUNT: 12.7 % (ref 10–15)
HBA1C MFR BLD: 5.3 %
HCT VFR BLD AUTO: 34.4 % (ref 35–47)
HGB BLD-MCNC: 12 G/DL (ref 11.7–15.7)
IMM GRANULOCYTES # BLD: 0 10E3/UL
IMM GRANULOCYTES NFR BLD: 0 %
LYMPHOCYTES # BLD AUTO: 2 10E3/UL (ref 0.8–5.3)
LYMPHOCYTES NFR BLD AUTO: 30 %
MCH RBC QN AUTO: 33.5 PG (ref 26.5–33)
MCHC RBC AUTO-ENTMCNC: 34.9 G/DL (ref 31.5–36.5)
MCV RBC AUTO: 96 FL (ref 78–100)
MONOCYTES # BLD AUTO: 0.5 10E3/UL (ref 0–1.3)
MONOCYTES NFR BLD AUTO: 8 %
NEUTROPHILS # BLD AUTO: 4.1 10E3/UL (ref 1.6–8.3)
NEUTROPHILS NFR BLD AUTO: 61 %
NRBC # BLD AUTO: 0 10E3/UL
NRBC BLD AUTO-RTO: 0 /100
PLATELET # BLD AUTO: 229 10E3/UL (ref 150–450)
RBC # BLD AUTO: 3.58 10E6/UL (ref 3.8–5.2)
WBC # BLD AUTO: 6.8 10E3/UL (ref 4–11)

## 2024-06-03 PROCEDURE — 80061 LIPID PANEL: CPT | Mod: ORL | Performed by: NURSE PRACTITIONER

## 2024-06-03 PROCEDURE — G0145 SCR C/V CYTO,THINLAYER,RESCR: HCPCS | Mod: ORL | Performed by: NURSE PRACTITIONER

## 2024-06-03 PROCEDURE — 82728 ASSAY OF FERRITIN: CPT | Mod: ORL | Performed by: NURSE PRACTITIONER

## 2024-06-03 PROCEDURE — 83036 HEMOGLOBIN GLYCOSYLATED A1C: CPT | Mod: ORL | Performed by: NURSE PRACTITIONER

## 2024-06-03 PROCEDURE — 77063 BREAST TOMOSYNTHESIS BI: CPT

## 2024-06-03 PROCEDURE — 84439 ASSAY OF FREE THYROXINE: CPT | Mod: ORL | Performed by: NURSE PRACTITIONER

## 2024-06-03 PROCEDURE — 87624 HPV HI-RISK TYP POOLED RSLT: CPT | Mod: ORL | Performed by: NURSE PRACTITIONER

## 2024-06-03 PROCEDURE — 85025 COMPLETE CBC W/AUTO DIFF WBC: CPT | Mod: ORL | Performed by: NURSE PRACTITIONER

## 2024-06-03 PROCEDURE — 80053 COMPREHEN METABOLIC PANEL: CPT | Mod: ORL | Performed by: NURSE PRACTITIONER

## 2024-06-03 PROCEDURE — 84466 ASSAY OF TRANSFERRIN: CPT | Mod: ORL | Performed by: NURSE PRACTITIONER

## 2024-06-03 PROCEDURE — 84443 ASSAY THYROID STIM HORMONE: CPT | Mod: ORL | Performed by: NURSE PRACTITIONER

## 2024-06-03 PROCEDURE — 83540 ASSAY OF IRON: CPT | Mod: ORL | Performed by: NURSE PRACTITIONER

## 2024-06-03 PROCEDURE — 83550 IRON BINDING TEST: CPT | Mod: ORL | Performed by: NURSE PRACTITIONER

## 2024-06-03 SDOH — HEALTH STABILITY: PHYSICAL HEALTH: ON AVERAGE, HOW MANY DAYS PER WEEK DO YOU ENGAGE IN MODERATE TO STRENUOUS EXERCISE (LIKE A BRISK WALK)?: 4 DAYS

## 2024-06-03 ASSESSMENT — ANXIETY QUESTIONNAIRES
4. TROUBLE RELAXING: SEVERAL DAYS
7. FEELING AFRAID AS IF SOMETHING AWFUL MIGHT HAPPEN: NOT AT ALL
GAD7 TOTAL SCORE: 5
3. WORRYING TOO MUCH ABOUT DIFFERENT THINGS: SEVERAL DAYS
5. BEING SO RESTLESS THAT IT IS HARD TO SIT STILL: NOT AT ALL
GAD7 TOTAL SCORE: 5
3. WORRYING TOO MUCH ABOUT DIFFERENT THINGS: SEVERAL DAYS
8. IF YOU CHECKED OFF ANY PROBLEMS, HOW DIFFICULT HAVE THESE MADE IT FOR YOU TO DO YOUR WORK, TAKE CARE OF THINGS AT HOME, OR GET ALONG WITH OTHER PEOPLE?: SOMEWHAT DIFFICULT
1. FEELING NERVOUS, ANXIOUS, OR ON EDGE: SEVERAL DAYS
IF YOU CHECKED OFF ANY PROBLEMS ON THIS QUESTIONNAIRE, HOW DIFFICULT HAVE THESE PROBLEMS MADE IT FOR YOU TO DO YOUR WORK, TAKE CARE OF THINGS AT HOME, OR GET ALONG WITH OTHER PEOPLE: SOMEWHAT DIFFICULT
GAD7 TOTAL SCORE: 5
6. BECOMING EASILY ANNOYED OR IRRITABLE: SEVERAL DAYS
1. FEELING NERVOUS, ANXIOUS, OR ON EDGE: SEVERAL DAYS
2. NOT BEING ABLE TO STOP OR CONTROL WORRYING: SEVERAL DAYS
7. FEELING AFRAID AS IF SOMETHING AWFUL MIGHT HAPPEN: NOT AT ALL

## 2024-06-03 ASSESSMENT — ENCOUNTER SYMPTOMS
HEMATOLOGIC/LYMPHATIC NEGATIVE: 1
RESPIRATORY NEGATIVE: 1
GASTROINTESTINAL NEGATIVE: 1
MUSCULOSKELETAL NEGATIVE: 1
NEUROLOGICAL NEGATIVE: 1
APPETITE CHANGE: 1
ENDOCRINE NEGATIVE: 1
CARDIOVASCULAR NEGATIVE: 1
PSYCHIATRIC NEGATIVE: 1
EYES NEGATIVE: 1

## 2024-06-03 ASSESSMENT — PAIN SCALES - GENERAL: PAINLEVEL: NO PAIN (0)

## 2024-06-03 ASSESSMENT — SOCIAL DETERMINANTS OF HEALTH (SDOH): HOW OFTEN DO YOU GET TOGETHER WITH FRIENDS OR RELATIVES?: TWICE A WEEK

## 2024-06-03 NOTE — PROGRESS NOTES
ANNUAL WELLNESS EXAM     Today's Date: Tadeo 3, 2024     Patient Keisha Thakur 1960 presents to the clinic today for a preventative health visit.         SUBJECTIVE     History of Present Illness:  Keisha feels like she has had a tough year with her immune system with food and environmental allergies and intolerances, she is following with an allergist.  She also continues to have GI concerns, she goes to [facility], she has had a long history of constipation, diarrhea, she has a colonoscopy scheduled very soon.        Allergies   Allergen Reactions    Turpentine Oil Itching     Gets lumps in the mouth and itchy tongue, trouble breathing        Current Outpatient Medications   Medication Instructions    Calcium Carb-Cholecalciferol (CALCIUM 1000 + D PO) Oral         estradiol (ESTRACE) 1 mg, Oral, DAILY    multivitamin w/minerals (THERA-VIT-M) tablet 1 tablet, Oral, DAILY    progesterone (PROMETRIUM) 200 mg, Oral, DAILY    traZODone (DESYREL) 100 MG tablet Take 100 mg at bedtime as needed    venlafaxine (EFFEXOR XR) 75 mg, Oral, DAILY    Vitamin D3 (CHOLECALCIFEROL) 400 Units, Oral       Past Medical History:   Diagnosis Date    Anxiety     Depression     IBS (irritable bowel syndrome)     Meningioma (H)     Post-menopause     Uncomplicated asthma         Family History   Problem Relation Age of Onset    Osteoporosis Mother     Hypertension Mother     Myocardial Infarction Mother     Lung Cancer Father     Kidney Disease Maternal Grandmother     Myocardial Infarction Brother         Do you have a first-degree relative with a history of the following:  A. Cancer of the breast or ovaries - No   B. Heart attack, heart pain, or stroke before the age of 55 - No  C. Unexplained death from drowning or car accident - No  D. Osteoporosis or any other significant bone health concerns - Yes    Social History     Tobacco Use    Smoking status: Never     Passive exposure: Past (parents smoked)    Smokeless tobacco: Never    Vaping Use    Vaping status: Never Used   Substance Use Topics    Alcohol use: Yes     Comment: 7/week, wine    Drug use: Never        History   Sexual Activity    Sexual activity: Not Currently    Partners: Male             3/14/2023     2:18 PM 4/18/2023    10:03 AM 5/29/2024     8:10 PM   PHQ   PHQ-9 Total Score  14 0   Q9: Thoughts of better off dead/self-harm past 2 weeks Not at all Not at all Not at all        Immunization History   Administered Date(s) Administered    COVID-19 Bivalent 12+ (Pfizer) 10/28/2022    COVID-19 Monovalent 12+ (Pfizer 2022) 03/17/2022    Hepatitis A (ADULT 19+) 05/08/2012    Influenza (IIV3) PF 11/11/2013, 11/09/2015, 10/20/2017    Influenza Vaccine >6 months,quad, PF 11/29/2016    Influenza Vaccine IM 18-49 Yrs, RIV3 11/11/2014, 11/14/2018    Influenza,INJ,MDCK,PF,Quad >6mo(Flucelvax) 10/28/2022    Japanese Encephalitis IM 04/08/2014, 05/06/2014    Mantoux Tuberculin Skin Test 07/28/2010    TDAP (Adacel,Boostrix) 05/08/2012    Twinrix A/B 04/08/2014, 05/06/2014    Typhoid IM 05/08/2012        Health Maintenance Due   Topic Date Due    ADVANCE CARE PLANNING  Never done    HIV SCREENING  Never done    HEPATITIS C SCREENING  Never done    ZOSTER IMMUNIZATION (1 of 2) Never done    DTAP/TDAP/TD IMMUNIZATION (2 - Td or Tdap) 05/08/2022    RSV VACCINE (Pregnancy & 60+) (1 - 1-dose 60+ series) Never done    PAP  07/08/2022    MAMMO SCREENING  03/03/2023    COVID-19 Vaccine (3 - 2023-24 season) 09/01/2023    YEARLY PREVENTIVE VISIT  03/14/2024    COLORECTAL CANCER SCREENING  05/09/2024      Health Maintenance components reviewed - Seasonal Influenza vaccine status is up to date & Covid-19 vaccine status is up to date.    Diet:  lots of food intolerances, hard to keep track and it changes    Exercise: it has been a bad time for exercising lately, she would like to get back to riding her bike more often    She does not want a DEXA scan - she had one in March of 2023 that showed low bone  "density, and she will not have another one again.    Review of Systems   Constitutional:  Positive for appetite change.        Associated with food intolerances   HENT: Negative.     Eyes: Negative.    Respiratory: Negative.     Cardiovascular: Negative.    Gastrointestinal: Negative.    Endocrine: Negative.    Breasts:  negative.    Genitourinary:  Positive for dyspareunia.        States very painful intercourse, dryness, infrequency   Musculoskeletal: Negative.    Skin: Negative.    Allergic/Immunologic: Positive for food allergies.   Neurological: Negative.    Hematological: Negative.    Psychiatric/Behavioral: Negative.              OBJECTIVE     /78 (BP Location: Left arm, Patient Position: Sitting, Cuff Size: Adult Small)   Pulse 82   Temp 97.7  F (36.5  C) (Oral)   Resp 16   Ht 1.57 m (5' 1.8\")   Wt 53.8 kg (118 lb 8 oz)   LMP 03/01/2015   SpO2 96%   BMI 21.81 kg/m         Estimated body mass index is 21.81 kg/m  as calculated from the following:    Height as of this encounter: 1.57 m (5' 1.8\").    Weight as of this encounter: 53.8 kg (118 lb 8 oz).    Complete \"Weight Managment Plan\" in the progress note from the Adult Preventative or Medicare smartsets, use phrase .WEIGHTPLAN, or choose an option from Weight Management [employer] smartset below.        Labs:  Lab Results   Component Value Date    WBC 7.6 03/14/2023    HGB 12.7 03/14/2023    HCT 37.5 03/14/2023     03/14/2023    CHOL 157 03/11/2022    TRIG 83 03/11/2022    HDL 92 03/11/2022    ALT 15 03/14/2023    AST 28 03/14/2023     03/14/2023    BUN 14.2 03/14/2023    CO2 22 03/14/2023    TSH 3.54 03/14/2023       Physical Exam  Constitutional:       Appearance: Normal appearance. She is normal weight.   HENT:      Head: Normocephalic and atraumatic.      Right Ear: Tympanic membrane, ear canal and external ear normal.      Left Ear: Tympanic membrane, ear canal and external ear normal.      Nose: Nose normal.      Mouth/Throat: "      Mouth: Mucous membranes are moist.      Pharynx: Oropharynx is clear.   Eyes:      Extraocular Movements: Extraocular movements intact.      Conjunctiva/sclera: Conjunctivae normal.      Pupils: Pupils are equal, round, and reactive to light.   Cardiovascular:      Rate and Rhythm: Normal rate and regular rhythm.      Pulses: Normal pulses.      Heart sounds: Normal heart sounds.   Pulmonary:      Effort: Pulmonary effort is normal.      Breath sounds: Normal breath sounds.   Chest:   Breasts:     Jose Score is 5.      Right: Normal.      Left: Normal.   Abdominal:      General: Abdomen is flat. Bowel sounds are normal.      Palpations: Abdomen is soft.   Genitourinary:     General: Normal vulva.      Exam position: Lithotomy position.      Jose stage (genital): 5.      Comments: normal  Musculoskeletal:         General: Normal range of motion.      Cervical back: Normal range of motion and neck supple.   Skin:     General: Skin is warm and dry.      Capillary Refill: Capillary refill takes less than 2 seconds.   Neurological:      General: No focal deficit present.      Mental Status: She is alert and oriented to person, place, and time.   Psychiatric:         Mood and Affect: Mood normal.         Behavior: Behavior normal.         Thought Content: Thought content normal.         Judgment: Judgment normal.      :  vaginal mucosa normal, cervix normal no adnexal tenderness         ASSESSMENT/PLAN     (Z00.00) Routine history and physical examination of adult  (primary encounter diagnosis)    Plan: Lipid panel reflex to direct LDL Fasting,         Comprehensive metabolic panel, CBC with         platelets differential, Hemoglobin A1c, TSH         with free T4 reflex, Gynecologic Cytology (Pap)        and HPV - Recommended Age 30-65 Years      (N94.10) Dyspareunia in female  Comment: discussed options, lubricants, education   Plan: Keisha will let me know if there is anything else I can do for  her    -Discussed/Reinforced healthy diety, lifestyle, exercise and safety.  -Recommended completion of routine dental and eye exam.  -Lab screenings completed today. Results pending.          PAP (if applicable): Complete Date of Completion: today Follow-up Recommendation: as needed or 2029 or not  CBE (if applicable): Complete Date of Completion: today Follow-up Recommendation: one year  Mammogram (if applicable): Complete Date of Completion: today Follow-up Recommendation: one year  Colon cancer screening (if applicable):  scheduled for next week    Dexa Bone Density Screening (if applicable):  Refused- does not want to have another one ever    Cholesterol Screening (if applicable): Complete Date of Completion: today Follow-up Recommendation: as needed  Diabetes Screening (if applicable): Complete Date of Completion: today Follow-up Recommendation: as needed   Thyroid Screening (if applicable): Complete Date of Completion: today Follow-up Recommendation: as needed  Depression Screening (if applicable): Complete Date of Completion: today Follow-up Recommendation: one year    Follow-Up:  Follow up in one year, or sooner if needed.     Patient engaged in their plan of care. Patient verbalized understanding and agreed with the final plan.  AVS printed and given to patient.    Antionette Robles NP   [facility]  [address] [place] [address] [phone]      Answers submitted by the patient for this visit:  Patient Health Questionnaire (Submitted on 5/29/2024)  If you checked off any problems, how difficult have these problems made it for you to do your work, take care of things at home, or get along with other people?: Not difficult at all  PHQ9 TOTAL SCORE: 0  ANDREA-7 (Submitted on 6/3/2024)  ANDREA 7 TOTAL SCORE: 5

## 2024-06-03 NOTE — PROGRESS NOTES
Answers submitted by the patient for this visit:  Patient Health Questionnaire (Submitted on 5/29/2024)  If you checked off any problems, how difficult have these problems made it for you to do your work, take care of things at home, or get along with other people?: Not difficult at all  PHQ9 TOTAL SCORE: 0  ANDREA-7 (Submitted on 6/3/2024)  ANDREA 7 TOTAL SCORE: 5

## 2024-06-03 NOTE — NURSING NOTE
"ROOM:1  [facility]    Preferred Name: Keisha     How did you hear about us?  Current Patient     Services Provided? No    63 year old  Chief Complaint   Patient presents with    Physical    Refill Request     Trazodone, Estradiol        Blood pressure 125/78, pulse 82, temperature 97.7  F (36.5  C), temperature source Oral, resp. rate 16, height 1.57 m (5' 1.8\"), weight 53.8 kg (118 lb 8 oz), last menstrual period 03/01/2015, SpO2 96%. Body mass index is 21.81 kg/m .  BP completed using cuff size:        There is no problem list on file for this patient.      Wt Readings from Last 2 Encounters:   06/03/24 53.8 kg (118 lb 8 oz)   03/19/24 53.5 kg (118 lb)     BP Readings from Last 3 Encounters:   06/03/24 125/78   03/14/24 122/84   12/13/23 136/86       Allergies   Allergen Reactions    Turpentine Oil Itching     Gets lumps in the mouth and itchy tongue, trouble breathing       Current Outpatient Medications   Medication Sig Dispense Refill    Calcium Carb-Cholecalciferol (CALCIUM 1000 + D PO)       cholecalciferol 25 MCG (1000 UT) TABS Take 400 Units by mouth      estradiol (ESTRACE) 1 MG tablet Take 1 tablet (1 mg) by mouth daily 90 tablet 3    multivitamin w/minerals (THERA-VIT-M) tablet Take 1 tablet by mouth daily      progesterone (PROMETRIUM) 200 MG capsule Take 1 capsule (200 mg) by mouth daily 90 capsule 3    traZODone (DESYREL) 100 MG tablet Take 100 mg at bedtime as needed 90 tablet 3    venlafaxine (EFFEXOR XR) 75 MG 24 hr capsule Take 1 capsule (75 mg) by mouth daily 90 capsule 1    escitalopram (LEXAPRO) 20 MG tablet Take 1 tablet (20 mg) by mouth daily (Patient not taking: Reported on 3/14/2024) 90 tablet 3     No current facility-administered medications for this visit.       Social History     Tobacco Use    Smoking status: Never     Passive exposure: Past (parents smoked)    Smokeless tobacco: Never   Vaping Use    Vaping status: Never Used   Substance Use Topics    Alcohol use: Yes " "    Comment: 7/week, wine    Drug use: Never       Honoring Choices - Health Care Directive Guide offered to patient at time of visit.    Health Maintenance Due   Topic Date Due    ADVANCE CARE PLANNING  Never done    HIV SCREENING  Never done    HEPATITIS C SCREENING  Never done    ZOSTER IMMUNIZATION (1 of 2) Never done    DTAP/TDAP/TD IMMUNIZATION (2 - Td or Tdap) 05/08/2022    RSV VACCINE (Pregnancy & 60+) (1 - 1-dose 60+ series) Never done    PAP  07/08/2022    MAMMO SCREENING  03/03/2023    COVID-19 Vaccine (3 - 2023-24 season) 09/01/2023    YEARLY PREVENTIVE VISIT  03/14/2024    COLORECTAL CANCER SCREENING  05/09/2024       Immunization History   Administered Date(s) Administered    COVID-19 Bivalent 12+ (Pfizer) 10/28/2022    COVID-19 Monovalent 12+ (Pfizer 2022) 03/17/2022    Hepatitis A (ADULT 19+) 05/08/2012    Influenza (IIV3) PF 11/11/2013, 11/09/2015, 10/20/2017    Influenza Vaccine >6 months,quad, PF 11/29/2016    Influenza Vaccine IM 18-49 Yrs, RIV3 11/11/2014, 11/14/2018    Influenza,INJ,MDCK,PF,Quad >6mo(Flucelvax) 10/28/2022    Japanese Encephalitis IM 04/08/2014, 05/06/2014    Mantoux Tuberculin Skin Test 07/28/2010    TDAP (Adacel,Boostrix) 05/08/2012    Twinrix A/B 04/08/2014, 05/06/2014    Typhoid IM 05/08/2012       No results found for: \"PAP\"    Recent Labs   Lab Test 03/14/23  1538 08/24/22  1451 03/11/22  0832   A1C 5.4  --   --    LDL  --   --  48   HDL  --   --  92   TRIG  --   --  83   ALT 15  --  16   CR 0.61  --  0.74   GFRESTIMATED >90  --  >90   ALBUMIN 4.5  --  3.5   POTASSIUM 4.0 4.5 5.5*   TSH 3.54 3.62 6.20*           5/8/2024     2:34 PM 3/19/2024    11:55 AM   PHQ-2 ( 1999 Pfizer)   Q1: Little interest or pleasure in doing things 0 0   Q2: Feeling down, depressed or hopeless 0 0   PHQ-2 Score 0 0           3/8/2022    11:23 AM 8/2/2022     9:22 AM 4/18/2023    10:03 AM 5/29/2024     8:10 PM   PHQ-9 SCORE   PHQ-9 Total Score MyChart    0   PHQ-9 Total Score 3 2 14 0           " 3/14/2023     2:18 PM 4/18/2023    10:03 AM 6/3/2024     2:42 PM   ANDREA-7 SCORE   Total Score   5 (mild anxiety)   Total Score 5 14 5            No data to display                Deana Carvalho    Vera 3, 2024 2:56 PM

## 2024-06-04 LAB
ALBUMIN SERPL BCG-MCNC: 4.3 G/DL (ref 3.5–5.2)
ALP SERPL-CCNC: 48 U/L (ref 40–150)
ALT SERPL W P-5'-P-CCNC: 11 U/L (ref 0–50)
ANION GAP SERPL CALCULATED.3IONS-SCNC: 11 MMOL/L (ref 7–15)
AST SERPL W P-5'-P-CCNC: 21 U/L (ref 0–45)
BILIRUB SERPL-MCNC: 0.3 MG/DL
BUN SERPL-MCNC: 11.5 MG/DL (ref 8–23)
CALCIUM SERPL-MCNC: 9.2 MG/DL (ref 8.8–10.2)
CHLORIDE SERPL-SCNC: 103 MMOL/L (ref 98–107)
CHOLEST SERPL-MCNC: 164 MG/DL
CREAT SERPL-MCNC: 0.69 MG/DL (ref 0.51–0.95)
DEPRECATED HCO3 PLAS-SCNC: 23 MMOL/L (ref 22–29)
EGFRCR SERPLBLD CKD-EPI 2021: >90 ML/MIN/1.73M2
FASTING STATUS PATIENT QL REPORTED: NORMAL
GLUCOSE SERPL-MCNC: 73 MG/DL (ref 70–99)
HDLC SERPL-MCNC: 90 MG/DL
HPV HR 12 DNA CVX QL NAA+PROBE: NEGATIVE
HPV16 DNA CVX QL NAA+PROBE: NEGATIVE
HPV18 DNA CVX QL NAA+PROBE: NEGATIVE
HUMAN PAPILLOMA VIRUS FINAL DIAGNOSIS: NORMAL
LDLC SERPL CALC-MCNC: 63 MG/DL
NONHDLC SERPL-MCNC: 74 MG/DL
POTASSIUM SERPL-SCNC: 3.9 MMOL/L (ref 3.4–5.3)
PROT SERPL-MCNC: 6.5 G/DL (ref 6.4–8.3)
SODIUM SERPL-SCNC: 137 MMOL/L (ref 135–145)
T4 FREE SERPL-MCNC: 0.88 NG/DL (ref 0.9–1.7)
TRIGL SERPL-MCNC: 56 MG/DL
TSH SERPL DL<=0.005 MIU/L-ACNC: 4.27 UIU/ML (ref 0.3–4.2)

## 2024-06-06 ENCOUNTER — VIRTUAL VISIT (OUTPATIENT)
Dept: FAMILY MEDICINE | Facility: CLINIC | Age: 64
End: 2024-06-06
Payer: COMMERCIAL

## 2024-06-06 DIAGNOSIS — E03.9 HYPOTHYROIDISM, UNSPECIFIED TYPE: Primary | ICD-10-CM

## 2024-06-06 DIAGNOSIS — D64.9 MILD ANEMIA: ICD-10-CM

## 2024-06-06 LAB
BKR LAB AP GYN ADEQUACY: NORMAL
BKR LAB AP GYN INTERPRETATION: NORMAL
BKR LAB AP PREVIOUS ABNORMAL: NORMAL
FERRITIN SERPL-MCNC: 28 NG/ML (ref 11–328)
IRON BINDING CAPACITY (ROCHE): 301 UG/DL (ref 240–430)
IRON SATN MFR SERPL: 27 % (ref 15–46)
IRON SERPL-MCNC: 80 UG/DL (ref 37–145)
PATH REPORT.COMMENTS IMP SPEC: NORMAL
PATH REPORT.COMMENTS IMP SPEC: NORMAL
PATH REPORT.RELEVANT HX SPEC: NORMAL
TRANSFERRIN SERPL-MCNC: 241 MG/DL (ref 200–360)

## 2024-06-06 RX ORDER — LEVOTHYROXINE SODIUM 25 UG/1
25 TABLET ORAL DAILY
Qty: 90 TABLET | Refills: 1 | Status: SHIPPED | OUTPATIENT
Start: 2024-06-06

## 2024-06-06 NOTE — PROGRESS NOTES
Keisha is a 63 year old who is being evaluated via a billable telephone visit.    What phone number would you like to be contacted at? [phone]  How would you like to obtain your AVS? Deon  Originating Location (pt. Location): Home      Distant Location (provider location):  On-site    Assessment & Plan     Hypothyroidism, unspecified type    - levothyroxine (SYNTHROID/LEVOTHROID) 25 MCG tablet; Take 1 tablet (25 mcg) by mouth daily  (E03.9) Hypothyroidism, unspecified type  (primary encounter diagnosis)    Plan: levothyroxine (SYNTHROID/LEVOTHROID) 25 MCG         tablet, TSH with free T4 reflex    (D64.9) Mild anemia    Plan: IRON AND IRON BINDING CAPACITY, Ferritin,         Transferrin, CBC with platelets differential          We also discussed her low hematocrit and RBC count with a normal hemoglobin.  She will try to eat more iron rich foods and/or take an iron supplement every other day.    Return to clinic for lab only in 4-6 weeks.    Subjective   Keisha is a 63 year old, presenting for the following health issues:  Results    Keisha is noted to have an elevated TSH and low T4.  She has a strong FH of this, her sister and her mother are both hypothyroid.  She does note fatigue and more hair loss over the past year than usual.      She is also concerned about her CBC panel, she has been anemic in the past.    Via the Health Maintenance questionnaire, the patient has reported the following services have been completed -Mammogram: Amada Pate 2024-06-03, this information has not been sent to the abstraction team.    Review of Systems  CONSTITUTIONAL: NEGATIVE for fever, chills, change in weight  ENT/MOUTH: NEGATIVE for ear, mouth and throat problems  RESP: NEGATIVE for significant cough or SOB  CV: NEGATIVE for chest pain, palpitations or peripheral edema      Objective       Vitals:  No vitals were obtained today due to virtual visit.    Physical Exam   General: Alert and no distress //Respiratory: No  audible wheeze, cough, or shortness of breath // Psychiatric:  Appropriate affect, tone, and pace of words    Office Visit on 06/03/2024   Component Date Value Ref Range Status    Human Papilloma Virus 16 DNA 06/03/2024 Negative  Negative Final    Human Papilloma Virus 18 DNA 06/03/2024 Negative  Negative Final    Human Papilloma Virus Other 06/03/2024 Negative  Negative Final    FINAL DIAGNOSIS 06/03/2024    Final                    Value:This result contains rich text formatting which cannot be displayed here.    Cholesterol 06/03/2024 164  <200 mg/dL Final    Triglycerides 06/03/2024 56  <150 mg/dL Final    Direct Measure HDL 06/03/2024 90  >=50 mg/dL Final    LDL Cholesterol Calculated 06/03/2024 63  <=100 mg/dL Final    Non HDL Cholesterol 06/03/2024 74  <130 mg/dL Final    Patient Fasting > 8hrs? 06/03/2024 Unknown   Final    Sodium 06/03/2024 137  135 - 145 mmol/L Final    Reference intervals for this test were updated on 09/26/2023 to more accurately reflect our healthy population. There may be differences in the flagging of prior results with similar values performed with this method. Interpretation of those prior results can be made in the context of the updated reference intervals.     Potassium 06/03/2024 3.9  3.4 - 5.3 mmol/L Final    Carbon Dioxide (CO2) 06/03/2024 23  22 - 29 mmol/L Final    Anion Gap 06/03/2024 11  7 - 15 mmol/L Final    Urea Nitrogen 06/03/2024 11.5  8.0 - 23.0 mg/dL Final    Creatinine 06/03/2024 0.69  0.51 - 0.95 mg/dL Final    GFR Estimate 06/03/2024 >90  >60 mL/min/1.73m2 Final    Calcium 06/03/2024 9.2  8.8 - 10.2 mg/dL Final    Chloride 06/03/2024 103  98 - 107 mmol/L Final    Glucose 06/03/2024 73  70 - 99 mg/dL Final    Alkaline Phosphatase 06/03/2024 48  40 - 150 U/L Final    AST 06/03/2024 21  0 - 45 U/L Final    Reference intervals for this test were updated on 6/12/2023 to more accurately reflect our healthy population. There may be differences in the flagging of prior  results with similar values performed with this method. Interpretation of those prior results can be made in the context of the updated reference intervals.    ALT 06/03/2024 11  0 - 50 U/L Final    Reference intervals for this test were updated on 6/12/2023 to more accurately reflect our healthy population. There may be differences in the flagging of prior results with similar values performed with this method. Interpretation of those prior results can be made in the context of the updated reference intervals.      Protein Total 06/03/2024 6.5  6.4 - 8.3 g/dL Final    Albumin 06/03/2024 4.3  3.5 - 5.2 g/dL Final    Bilirubin Total 06/03/2024 0.3  <=1.2 mg/dL Final    Hemoglobin A1C 06/03/2024 5.3  <5.7 % Final    Normal <5.7%   Prediabetes 5.7-6.4%    Diabetes 6.5% or higher     Note: Adopted from ADA consensus guidelines.    TSH 06/03/2024 4.27 (H)  0.30 - 4.20 uIU/mL Final    WBC Count 06/03/2024 6.8  4.0 - 11.0 10e3/uL Final    RBC Count 06/03/2024 3.58 (L)  3.80 - 5.20 10e6/uL Final    Hemoglobin 06/03/2024 12.0  11.7 - 15.7 g/dL Final    Hematocrit 06/03/2024 34.4 (L)  35.0 - 47.0 % Final    MCV 06/03/2024 96  78 - 100 fL Final    MCH 06/03/2024 33.5 (H)  26.5 - 33.0 pg Final    MCHC 06/03/2024 34.9  31.5 - 36.5 g/dL Final    RDW 06/03/2024 12.7  10.0 - 15.0 % Final    Platelet Count 06/03/2024 229  150 - 450 10e3/uL Final    % Neutrophils 06/03/2024 61  % Final    % Lymphocytes 06/03/2024 30  % Final    % Monocytes 06/03/2024 8  % Final    % Eosinophils 06/03/2024 1  % Final    % Basophils 06/03/2024 0  % Final    % Immature Granulocytes 06/03/2024 0  % Final    NRBCs per 100 WBC 06/03/2024 0  <1 /100 Final    Absolute Neutrophils 06/03/2024 4.1  1.6 - 8.3 10e3/uL Final    Absolute Lymphocytes 06/03/2024 2.0  0.8 - 5.3 10e3/uL Final    Absolute Monocytes 06/03/2024 0.5  0.0 - 1.3 10e3/uL Final    Absolute Eosinophils 06/03/2024 0.1  0.0 - 0.7 10e3/uL Final    Absolute Basophils 06/03/2024 0.0  0.0 - 0.2  10e3/uL Final    Absolute Immature Granulocytes 06/03/2024 0.0  <=0.4 10e3/uL Final    Absolute NRBCs 06/03/2024 0.0  10e3/uL Final    Free T4 06/03/2024 0.88 (L)  0.90 - 1.70 ng/dL Final         Phone call duration: 8 minutes  Signed Electronically by: Antionette Robles NP

## 2024-06-07 RX ORDER — TRAZODONE HYDROCHLORIDE 100 MG/1
TABLET ORAL
Qty: 90 TABLET | Refills: 3 | Status: SHIPPED | OUTPATIENT
Start: 2024-06-07

## 2024-06-07 RX ORDER — ESTRADIOL 1 MG/1
1 TABLET ORAL DAILY
Qty: 90 TABLET | Refills: 3 | Status: SHIPPED | OUTPATIENT
Start: 2024-06-07

## 2024-06-07 NOTE — TELEPHONE ENCOUNTER
LVD:  5/8/2024  [facility] Antionette Zhang, NP  Family Medicine     Refilled per protocol.

## 2024-07-03 ENCOUNTER — TRANSFERRED RECORDS (OUTPATIENT)
Dept: HEALTH INFORMATION MANAGEMENT | Facility: CLINIC | Age: 64
End: 2024-07-03

## 2024-07-08 ENCOUNTER — TRANSFERRED RECORDS (OUTPATIENT)
Dept: HEALTH INFORMATION MANAGEMENT | Facility: CLINIC | Age: 64
End: 2024-07-08
Payer: COMMERCIAL

## 2024-08-07 ENCOUNTER — TRANSFERRED RECORDS (OUTPATIENT)
Dept: HEALTH INFORMATION MANAGEMENT | Facility: CLINIC | Age: 64
End: 2024-08-07

## 2024-09-05 DIAGNOSIS — N95.1 MENOPAUSAL SYNDROME (HOT FLASHES): ICD-10-CM

## 2024-09-09 RX ORDER — PROGESTERONE 200 MG/1
200 CAPSULE ORAL DAILY
Qty: 90 CAPSULE | Refills: 3 | Status: SHIPPED | OUTPATIENT
Start: 2024-09-09

## 2024-09-09 NOTE — TELEPHONE ENCOUNTER
Medication Requested:  progesterone (PROMETRIUM) 200 MG capsule 90 capsule 3 9/7/2023 -- No   Sig - Route: Take 1 capsule (200 mg) by mouth daily - Oral     ----------------------  Last Office Visit :     6/6/2024   [facility]      Future Office visit:  none  ----------------------      Refill decision: Refill pended and routed to the provider for review/determination due to the following criteria not met:     Medication unable to be refilled by RN due to criteria not met as indicated.                 []Supervision - Pt not seen within past 12 months, no future appointment              []Compliance - lapse in therapy/gap in refills              []Verification - order discrepancy, clarification needed              []Controlled medication              []Medication not on [facility], [facility], [facility] refill protocol   [] Abnormal labs/test:  [] Overdue labs/test:    []Dinah refill given x1, Pt did not follow-up, pended to care team for decision  [] Medication not active on Pt's med list  [] Drug interaction Warning  [] Medication is Reported/Historical              [x]Other: Medication not indicated for associated diagnosis    Pass/Fail Protocol Criteria:    Hormone Replacement Therapy [id number] 03:16 PM   Protocol Details Medication indicated for associated diagnosis    Blood pressure under 140/90 in past 12 months    Medication is active on med list    Recent (12 mo) or future (90 days) visit within the authorizing provider's specialty    Patient is 18 years of age or older    No active pregnancy on record    No positive pregnancy test on record in past 12 months

## 2024-12-02 DIAGNOSIS — E03.9 HYPOTHYROIDISM, UNSPECIFIED TYPE: ICD-10-CM

## 2024-12-02 NOTE — TELEPHONE ENCOUNTER
Medication Requested:  levothyroxine (SYNTHROID/LEVOTHROID) 25 MCG tablet 90 tablet 1 6/6/2024 -- No   Sig - Route: Take 1 tablet (25 mcg) by mouth daily - Oral     ----------------------  Last Office Visit : 6/6/2024   [facility]      Future Office visit:  none  ----------------------    Routing refill request to provider for review/approval because:     [facility] has closed effective date 10/31/24. Patient no longer under provider care, care must be established elsewhere.      TSH   Date Value Ref Range Status   07/11/2024 3.07 0.30 - 4.20 uIU/mL Final   08/24/2022 3.62 0.40 - 4.00 mU/L Final              normal... Well appearing, well nourished, awake, alert, oriented to person, place, time/situation and in no apparent distress.

## 2024-12-04 RX ORDER — LEVOTHYROXINE SODIUM 25 UG/1
25 TABLET ORAL DAILY
Qty: 30 TABLET | Refills: 0 | Status: SHIPPED | OUTPATIENT
Start: 2024-12-04

## 2024-12-31 DIAGNOSIS — E03.9 HYPOTHYROIDISM, UNSPECIFIED TYPE: ICD-10-CM

## 2025-01-06 ENCOUNTER — VIRTUAL VISIT (OUTPATIENT)
Dept: FAMILY MEDICINE | Facility: CLINIC | Age: 65
End: 2025-01-06
Payer: COMMERCIAL

## 2025-01-06 DIAGNOSIS — E03.9 HYPOTHYROIDISM, UNSPECIFIED TYPE: ICD-10-CM

## 2025-01-06 PROCEDURE — 98005 SYNCH AUDIO-VIDEO EST LOW 20: CPT

## 2025-01-06 RX ORDER — LEVOTHYROXINE SODIUM 25 UG/1
25 TABLET ORAL DAILY
Qty: 90 TABLET | Refills: 1 | Status: SHIPPED | OUTPATIENT
Start: 2025-01-06

## 2025-01-06 RX ORDER — LEVOTHYROXINE SODIUM 25 UG/1
TABLET ORAL
Qty: 30 TABLET | Refills: 0 | OUTPATIENT
Start: 2025-01-06

## 2025-01-06 NOTE — TELEPHONE ENCOUNTER
levothyroxine (SYNTHROID/LEVOTHROID) 25 MCG   Last Written Prescription Date:  9/11/24  Last Fill Quantity: 90,   # refills: 0  Last Office Visit : 6/6/24  Future Office visit:  NONE

## 2025-01-06 NOTE — PROGRESS NOTES
Keisha is a 64 year old who is being evaluated via a billable video visit.    How would you like to obtain your AVS? MyChart  If the video visit is dropped, the invitation should be resent by: Text to cell phone: [phone]  Will anyone else be joining your video visit? No      Assessment & Plan     Hypothyroidism, unspecified type  Chronic, stable. Needed refill since previous clinic closed. Will be traveling for 3 months so sending 90 day supply + 1 refill until she can establish care with new provider.   - levothyroxine (SYNTHROID/LEVOTHROID) 25 MCG tablet  Dispense: 90 tablet; Refill: 1              See Patient Instructions    Subjective   Keisha is a 64 year old, presenting for the following health issues:  Refill Request    HPI   New to me.   [facility].    Hypothyroidism Follow-up    Since last visit, patient describes the following symptoms: constipation, loose stools, - chronic IBS/unchanged.   How many servings of fruits and vegetables do you eat daily?  4 or more  On average, how many sweetened beverages do you drink each day (Examples: soda, juice, sweet tea, etc.  Do NOT count diet or artificially sweetened beverages)?   0  How many days per week do you exercise enough to make your heart beat faster? 4  How many minutes a day do you exercise enough to make your heart beat faster? 20 - 29  How many days per week do you miss taking your medication? 0    Pt wishes to continue same dose.     Review of Systems  Constitutional, HEENT, cardiovascular, pulmonary, gi and gu systems are negative, except as otherwise noted.      Objective    Vitals - Patient Reported  Pain Score: No Pain (0)        Physical Exam   GENERAL: alert and no distress  EYES: Eyes grossly normal to inspection.  No discharge or erythema, or obvious scleral/conjunctival abnormalities.  RESP: No audible wheeze, cough, or visible cyanosis.    SKIN: Visible skin clear. No significant rash, abnormal pigmentation or  lesions.  NEURO: Cranial nerves grossly intact.  Mentation and speech appropriate for age.  PSYCH: Appropriate affect, tone, and pace of words    Lab on 07/11/2024   Component Date Value Ref Range Status    TSH 07/11/2024 3.07  0.30 - 4.20 uIU/mL Final    WBC Count 07/11/2024 7.2  4.0 - 11.0 10e3/uL Final    RBC Count 07/11/2024 3.66 (L)  3.80 - 5.20 10e6/uL Final    Hemoglobin 07/11/2024 11.9  11.7 - 15.7 g/dL Final    Hematocrit 07/11/2024 35.6  35.0 - 47.0 % Final    MCV 07/11/2024 97  78 - 100 fL Final    MCH 07/11/2024 32.5  26.5 - 33.0 pg Final    MCHC 07/11/2024 33.4  31.5 - 36.5 g/dL Final    RDW 07/11/2024 12.1  10.0 - 15.0 % Final    Platelet Count 07/11/2024 241  150 - 450 10e3/uL Final    % Neutrophils 07/11/2024 65  % Final    % Lymphocytes 07/11/2024 26  % Final    % Monocytes 07/11/2024 7  % Final    % Eosinophils 07/11/2024 2  % Final    % Basophils 07/11/2024 0  % Final    % Immature Granulocytes 07/11/2024 0  % Final    Absolute Neutrophils 07/11/2024 4.6  1.6 - 8.3 10e3/uL Final    Absolute Lymphocytes 07/11/2024 1.9  0.8 - 5.3 10e3/uL Final    Absolute Monocytes 07/11/2024 0.5  0.0 - 1.3 10e3/uL Final    Absolute Eosinophils 07/11/2024 0.1  0.0 - 0.7 10e3/uL Final    Absolute Basophils 07/11/2024 0.0  0.0 - 0.2 10e3/uL Final    Absolute Immature Granulocytes 07/11/2024 0.0  <=0.4 10e3/uL Final         Video-Visit Details  Joined the call at 1/6/2025, 4:43:22 pm.  Left the call at 1/6/2025, 4:47:33 pm.  You were on the call for 4 minutes 11 seconds   Type of service:  Video Visit   Originating Location (pt. Location): Home    Distant Location (provider location):  On-site  Platform used for Video Visit: Kasey  Signed Electronically by: LUCY Moss CNP

## 2025-01-06 NOTE — PATIENT INSTRUCTIONS
Jarad Valdes,    It was nice to meet you via video visit. Here is a summary of what we discussed and follow up instructions. Please let me know if you have any questions.    Sent 90 day supply of levothyroxine + 1 refill.     25 mcg daily is a very low dose, may need to increase in the future so should continue to monitor your thyroid labs at least every year and with and symptom changes.  See handouts for more information on what to lookout for.     Your last annual appointment with in 06/2024, recommend scheduling an establish care/preventative visit with a new provider around that time. [facility] Thanks,    Chato

## 2025-01-13 ENCOUNTER — MYC MEDICAL ADVICE (OUTPATIENT)
Dept: FAMILY MEDICINE | Facility: CLINIC | Age: 65
End: 2025-01-13
Payer: COMMERCIAL

## 2025-01-13 DIAGNOSIS — F41.9 ANXIETY AND DEPRESSION: ICD-10-CM

## 2025-01-13 DIAGNOSIS — F32.A ANXIETY AND DEPRESSION: ICD-10-CM

## 2025-01-14 RX ORDER — VENLAFAXINE HYDROCHLORIDE 75 MG/1
75 CAPSULE, EXTENDED RELEASE ORAL DAILY
Qty: 90 CAPSULE | Refills: 1 | OUTPATIENT
Start: 2025-01-14

## 2025-01-15 ENCOUNTER — E-VISIT (OUTPATIENT)
Dept: FAMILY MEDICINE | Facility: CLINIC | Age: 65
End: 2025-01-15
Payer: COMMERCIAL

## 2025-01-15 DIAGNOSIS — F41.9 ANXIETY AND DEPRESSION: Primary | ICD-10-CM

## 2025-01-15 DIAGNOSIS — F32.A ANXIETY AND DEPRESSION: Primary | ICD-10-CM

## 2025-01-15 PROCEDURE — 99207 PR NON-BILLABLE SERV PER CHARTING: CPT

## 2025-01-15 ASSESSMENT — ANXIETY QUESTIONNAIRES
2. NOT BEING ABLE TO STOP OR CONTROL WORRYING: SEVERAL DAYS
5. BEING SO RESTLESS THAT IT IS HARD TO SIT STILL: NOT AT ALL
GAD7 TOTAL SCORE: 5
6. BECOMING EASILY ANNOYED OR IRRITABLE: SEVERAL DAYS
3. WORRYING TOO MUCH ABOUT DIFFERENT THINGS: SEVERAL DAYS
GAD7 TOTAL SCORE: 5
GAD7 TOTAL SCORE: 5
1. FEELING NERVOUS, ANXIOUS, OR ON EDGE: SEVERAL DAYS
8. IF YOU CHECKED OFF ANY PROBLEMS, HOW DIFFICULT HAVE THESE MADE IT FOR YOU TO DO YOUR WORK, TAKE CARE OF THINGS AT HOME, OR GET ALONG WITH OTHER PEOPLE?: SOMEWHAT DIFFICULT
7. FEELING AFRAID AS IF SOMETHING AWFUL MIGHT HAPPEN: NOT AT ALL
IF YOU CHECKED OFF ANY PROBLEMS ON THIS QUESTIONNAIRE, HOW DIFFICULT HAVE THESE PROBLEMS MADE IT FOR YOU TO DO YOUR WORK, TAKE CARE OF THINGS AT HOME, OR GET ALONG WITH OTHER PEOPLE: SOMEWHAT DIFFICULT
7. FEELING AFRAID AS IF SOMETHING AWFUL MIGHT HAPPEN: NOT AT ALL
4. TROUBLE RELAXING: SEVERAL DAYS

## 2025-01-15 ASSESSMENT — PATIENT HEALTH QUESTIONNAIRE - PHQ9
SUM OF ALL RESPONSES TO PHQ QUESTIONS 1-9: 5
10. IF YOU CHECKED OFF ANY PROBLEMS, HOW DIFFICULT HAVE THESE PROBLEMS MADE IT FOR YOU TO DO YOUR WORK, TAKE CARE OF THINGS AT HOME, OR GET ALONG WITH OTHER PEOPLE: SOMEWHAT DIFFICULT
SUM OF ALL RESPONSES TO PHQ QUESTIONS 1-9: 5

## 2025-01-16 RX ORDER — VENLAFAXINE HYDROCHLORIDE 75 MG/1
75 CAPSULE, EXTENDED RELEASE ORAL DAILY
Qty: 90 CAPSULE | Refills: 1 | Status: SHIPPED | OUTPATIENT
Start: 2025-01-16

## 2025-01-16 ASSESSMENT — PATIENT HEALTH QUESTIONNAIRE - PHQ9: SUM OF ALL RESPONSES TO PHQ QUESTIONS 1-9: 5

## 2025-01-16 NOTE — PATIENT INSTRUCTIONS
Thank you for choosing us for your care. I think a virtual visit would be the best next step based on your symptoms. Please schedule a clinic appointment; you won t be charged for this eVisit.      You can schedule an appointment by clicking here in [employer], or call [phone].

## 2025-06-01 DIAGNOSIS — N95.1 MENOPAUSAL SYNDROME (HOT FLASHES): ICD-10-CM

## 2025-06-03 RX ORDER — ESTRADIOL 1 MG/1
1 TABLET ORAL DAILY
Qty: 90 TABLET | Refills: 3 | OUTPATIENT
Start: 2025-06-03

## 2025-06-03 NOTE — TELEPHONE ENCOUNTER
[facility] has closed effective date 10/31/24. Please establish care with alternate clinic and provider. Per  email 12/6/24   [place] staff will be monitoring any messages that come to the NP clinic inbox [[facility]]  after 1/1/25. Central Refill team is no longer monitoring/ processing these requests.  No notes/ restablishing care are found.

## 2025-07-13 DIAGNOSIS — F41.9 ANXIETY: ICD-10-CM

## 2025-07-15 RX ORDER — TRAZODONE HYDROCHLORIDE 100 MG/1
100 TABLET ORAL
Qty: 90 TABLET | Refills: 3 | OUTPATIENT
Start: 2025-07-15

## 2025-07-19 DIAGNOSIS — E03.9 HYPOTHYROIDISM, UNSPECIFIED TYPE: ICD-10-CM

## 2025-07-19 DIAGNOSIS — N95.1 MENOPAUSAL SYNDROME (HOT FLASHES): ICD-10-CM

## 2025-07-20 ENCOUNTER — HEALTH MAINTENANCE LETTER (OUTPATIENT)
Age: 65
End: 2025-07-20

## 2025-07-21 RX ORDER — LEVOTHYROXINE SODIUM 25 UG/1
25 TABLET ORAL DAILY
Qty: 90 TABLET | Refills: 1 | Status: SHIPPED | OUTPATIENT
Start: 2025-07-21

## 2025-07-22 RX ORDER — PROGESTERONE 200 MG/1
200 CAPSULE ORAL DAILY
Qty: 90 CAPSULE | Refills: 3 | OUTPATIENT
Start: 2025-07-22

## 2025-08-18 ENCOUNTER — TELEPHONE (OUTPATIENT)
Dept: NEUROSURGERY | Facility: CLINIC | Age: 65
End: 2025-08-18
Payer: COMMERCIAL

## 2025-08-21 ENCOUNTER — TELEPHONE (OUTPATIENT)
Dept: NEUROSURGERY | Facility: CLINIC | Age: 65
End: 2025-08-21
Payer: COMMERCIAL